# Patient Record
Sex: FEMALE | Race: WHITE | Employment: PART TIME | URBAN - METROPOLITAN AREA
[De-identification: names, ages, dates, MRNs, and addresses within clinical notes are randomized per-mention and may not be internally consistent; named-entity substitution may affect disease eponyms.]

---

## 2017-01-12 DIAGNOSIS — F32.A DEPRESSION: ICD-10-CM

## 2017-01-13 RX ORDER — VENLAFAXINE HYDROCHLORIDE 37.5 MG/1
CAPSULE, EXTENDED RELEASE ORAL
Qty: 30 CAPSULE | Refills: 5 | Status: SHIPPED | OUTPATIENT
Start: 2017-01-13 | End: 2017-07-19 | Stop reason: SDUPTHER

## 2017-02-22 RX ORDER — ADAPALENE/BENZOYL PEROXIDE 0.1 %-2.5%
GEL (GRAM) TOPICAL
Qty: 45 G | Refills: 0 | Status: SHIPPED | OUTPATIENT
Start: 2017-02-22 | End: 2018-05-04 | Stop reason: SDUPTHER

## 2017-03-17 RX ORDER — LISINOPRIL AND HYDROCHLOROTHIAZIDE 12.5; 1 MG/1; MG/1
TABLET ORAL
Qty: 90 TABLET | Refills: 1 | Status: SHIPPED | OUTPATIENT
Start: 2017-03-17 | End: 2017-09-17 | Stop reason: SDUPTHER

## 2017-04-18 RX ORDER — ONDANSETRON 8 MG/1
TABLET, ORALLY DISINTEGRATING ORAL
Qty: 40 TABLET | Refills: 0 | Status: SHIPPED | OUTPATIENT
Start: 2017-04-18 | End: 2018-05-04 | Stop reason: SDUPTHER

## 2017-04-18 RX ORDER — LEVOTHYROXINE SODIUM 0.05 MG/1
TABLET ORAL
Qty: 90 TABLET | Refills: 0 | Status: SHIPPED | OUTPATIENT
Start: 2017-04-18 | End: 2017-07-19 | Stop reason: SDUPTHER

## 2017-07-19 DIAGNOSIS — F32.A DEPRESSION: ICD-10-CM

## 2017-07-19 RX ORDER — VENLAFAXINE HYDROCHLORIDE 37.5 MG/1
CAPSULE, EXTENDED RELEASE ORAL
Qty: 30 CAPSULE | Refills: 0 | Status: SHIPPED | OUTPATIENT
Start: 2017-07-19 | End: 2017-08-21 | Stop reason: SDUPTHER

## 2017-07-19 RX ORDER — LEVOTHYROXINE SODIUM 0.05 MG/1
TABLET ORAL
Qty: 90 TABLET | Refills: 0 | Status: SHIPPED | OUTPATIENT
Start: 2017-07-19 | End: 2017-10-23 | Stop reason: SDUPTHER

## 2017-08-21 RX ORDER — VENLAFAXINE HYDROCHLORIDE 37.5 MG/1
CAPSULE, EXTENDED RELEASE ORAL
Qty: 30 CAPSULE | Refills: 3 | Status: SHIPPED | OUTPATIENT
Start: 2017-08-21 | End: 2017-12-25 | Stop reason: SDUPTHER

## 2017-09-18 RX ORDER — LISINOPRIL AND HYDROCHLOROTHIAZIDE 12.5; 1 MG/1; MG/1
TABLET ORAL
Qty: 90 TABLET | Refills: 1 | Status: SHIPPED | OUTPATIENT
Start: 2017-09-18 | End: 2018-04-14 | Stop reason: SDUPTHER

## 2017-10-23 RX ORDER — LEVOTHYROXINE SODIUM 0.05 MG/1
TABLET ORAL
Qty: 90 TABLET | Refills: 1 | Status: SHIPPED | OUTPATIENT
Start: 2017-10-23 | End: 2018-04-15 | Stop reason: SDUPTHER

## 2017-12-26 RX ORDER — VENLAFAXINE HYDROCHLORIDE 37.5 MG/1
37.5 CAPSULE, EXTENDED RELEASE ORAL DAILY
Qty: 30 CAPSULE | Refills: 1 | Status: SHIPPED | OUTPATIENT
Start: 2017-12-26 | End: 2018-02-23 | Stop reason: SDUPTHER

## 2018-02-23 RX ORDER — VENLAFAXINE HYDROCHLORIDE 37.5 MG/1
CAPSULE, EXTENDED RELEASE ORAL
Qty: 30 CAPSULE | Refills: 1 | Status: SHIPPED | OUTPATIENT
Start: 2018-02-23 | End: 2018-05-04 | Stop reason: CLARIF

## 2018-04-16 RX ORDER — LEVOTHYROXINE SODIUM 0.05 MG/1
TABLET ORAL
Qty: 90 TABLET | Refills: 1 | Status: SHIPPED | OUTPATIENT
Start: 2018-04-16 | End: 2022-02-18 | Stop reason: SDUPTHER

## 2018-04-16 RX ORDER — LISINOPRIL AND HYDROCHLOROTHIAZIDE 12.5; 1 MG/1; MG/1
TABLET ORAL
Qty: 90 TABLET | Refills: 1 | Status: SHIPPED | OUTPATIENT
Start: 2018-04-16 | End: 2018-09-26 | Stop reason: SDUPTHER

## 2018-05-04 ENCOUNTER — OFFICE VISIT (OUTPATIENT)
Dept: FAMILY MEDICINE CLINIC | Age: 53
End: 2018-05-04
Payer: COMMERCIAL

## 2018-05-04 VITALS
DIASTOLIC BLOOD PRESSURE: 64 MMHG | HEART RATE: 72 BPM | SYSTOLIC BLOOD PRESSURE: 122 MMHG | RESPIRATION RATE: 16 BRPM | TEMPERATURE: 98.1 F | HEIGHT: 62 IN

## 2018-05-04 DIAGNOSIS — N95.1 PERIMENOPAUSAL: ICD-10-CM

## 2018-05-04 DIAGNOSIS — Z13.31 POSITIVE DEPRESSION SCREENING: ICD-10-CM

## 2018-05-04 DIAGNOSIS — G89.29 CHRONIC BILATERAL LOW BACK PAIN WITHOUT SCIATICA: ICD-10-CM

## 2018-05-04 DIAGNOSIS — E03.9 HYPOTHYROIDISM (ACQUIRED): ICD-10-CM

## 2018-05-04 DIAGNOSIS — F32.A ANXIETY AND DEPRESSION: ICD-10-CM

## 2018-05-04 DIAGNOSIS — R73.9 HYPERGLYCEMIA: ICD-10-CM

## 2018-05-04 DIAGNOSIS — F41.9 ANXIETY AND DEPRESSION: ICD-10-CM

## 2018-05-04 DIAGNOSIS — Z12.31 ENCOUNTER FOR SCREENING MAMMOGRAM FOR BREAST CANCER: ICD-10-CM

## 2018-05-04 DIAGNOSIS — E78.2 MIXED HYPERLIPIDEMIA: Primary | ICD-10-CM

## 2018-05-04 DIAGNOSIS — Z80.0 FAMILY HISTORY OF COLON CANCER: ICD-10-CM

## 2018-05-04 DIAGNOSIS — E78.2 MIXED HYPERLIPIDEMIA: ICD-10-CM

## 2018-05-04 DIAGNOSIS — M54.50 CHRONIC BILATERAL LOW BACK PAIN WITHOUT SCIATICA: ICD-10-CM

## 2018-05-04 LAB
HCT VFR BLD CALC: 40 % (ref 37–47)
HEMOGLOBIN: 13.5 G/DL (ref 12–16)
MCH RBC QN AUTO: 32.2 PG (ref 27–31.3)
MCHC RBC AUTO-ENTMCNC: 33.8 % (ref 33–37)
MCV RBC AUTO: 95.3 FL (ref 82–100)
PDW BLD-RTO: 13.3 % (ref 11.5–14.5)
PLATELET # BLD: 317 K/UL (ref 130–400)
RBC # BLD: 4.2 M/UL (ref 4.2–5.4)
WBC # BLD: 9.8 K/UL (ref 4.8–10.8)

## 2018-05-04 PROCEDURE — G8431 POS CLIN DEPRES SCRN F/U DOC: HCPCS | Performed by: FAMILY MEDICINE

## 2018-05-04 PROCEDURE — 99214 OFFICE O/P EST MOD 30 MIN: CPT | Performed by: FAMILY MEDICINE

## 2018-05-04 RX ORDER — IBUPROFEN 800 MG/1
800 TABLET ORAL EVERY 8 HOURS PRN
Qty: 90 TABLET | Refills: 3 | Status: SHIPPED | OUTPATIENT
Start: 2018-05-04 | End: 2018-09-26 | Stop reason: SDUPTHER

## 2018-05-04 RX ORDER — ONDANSETRON HYDROCHLORIDE 8 MG/1
8 TABLET, FILM COATED ORAL EVERY 8 HOURS PRN
Qty: 30 TABLET | Refills: 1 | Status: SHIPPED | OUTPATIENT
Start: 2018-05-04 | End: 2018-09-26 | Stop reason: SDUPTHER

## 2018-05-04 RX ORDER — VENLAFAXINE HYDROCHLORIDE 75 MG/1
75 CAPSULE, EXTENDED RELEASE ORAL DAILY
Qty: 30 CAPSULE | Refills: 3 | Status: SHIPPED | OUTPATIENT
Start: 2018-05-04 | End: 2018-09-02 | Stop reason: SDUPTHER

## 2018-05-04 ASSESSMENT — PATIENT HEALTH QUESTIONNAIRE - PHQ9
3. TROUBLE FALLING OR STAYING ASLEEP: 1
2. FEELING DOWN, DEPRESSED OR HOPELESS: 2
10. IF YOU CHECKED OFF ANY PROBLEMS, HOW DIFFICULT HAVE THESE PROBLEMS MADE IT FOR YOU TO DO YOUR WORK, TAKE CARE OF THINGS AT HOME, OR GET ALONG WITH OTHER PEOPLE: 1
4. FEELING TIRED OR HAVING LITTLE ENERGY: 0
9. THOUGHTS THAT YOU WOULD BE BETTER OFF DEAD, OR OF HURTING YOURSELF: 0
6. FEELING BAD ABOUT YOURSELF - OR THAT YOU ARE A FAILURE OR HAVE LET YOURSELF OR YOUR FAMILY DOWN: 2
8. MOVING OR SPEAKING SO SLOWLY THAT OTHER PEOPLE COULD HAVE NOTICED. OR THE OPPOSITE, BEING SO FIGETY OR RESTLESS THAT YOU HAVE BEEN MOVING AROUND A LOT MORE THAN USUAL: 1
SUM OF ALL RESPONSES TO PHQ QUESTIONS 1-9: 10
5. POOR APPETITE OR OVEREATING: 1
7. TROUBLE CONCENTRATING ON THINGS, SUCH AS READING THE NEWSPAPER OR WATCHING TELEVISION: 2
1. LITTLE INTEREST OR PLEASURE IN DOING THINGS: 1
SUM OF ALL RESPONSES TO PHQ9 QUESTIONS 1 & 2: 3

## 2018-05-05 LAB
ALBUMIN SERPL-MCNC: 4.4 G/DL (ref 3.9–4.9)
ALP BLD-CCNC: 87 U/L (ref 40–130)
ALT SERPL-CCNC: 15 U/L (ref 0–33)
ANION GAP SERPL CALCULATED.3IONS-SCNC: 15 MEQ/L (ref 7–13)
AST SERPL-CCNC: 14 U/L (ref 0–35)
BILIRUB SERPL-MCNC: 0.3 MG/DL (ref 0–1.2)
BUN BLDV-MCNC: 29 MG/DL (ref 6–20)
CALCIUM SERPL-MCNC: 9 MG/DL (ref 8.6–10.2)
CHLORIDE BLD-SCNC: 100 MEQ/L (ref 98–107)
CHOLESTEROL, TOTAL: 224 MG/DL (ref 0–199)
CO2: 24 MEQ/L (ref 22–29)
CREAT SERPL-MCNC: 0.85 MG/DL (ref 0.5–0.9)
FOLLICLE STIMULATING HORMONE: 106.1 MIU/ML
GFR AFRICAN AMERICAN: >60
GFR NON-AFRICAN AMERICAN: >60
GLOBULIN: 2.4 G/DL (ref 2.3–3.5)
GLUCOSE BLD-MCNC: 68 MG/DL (ref 74–109)
HBA1C MFR BLD: 5.5 % (ref 4.8–5.9)
HDLC SERPL-MCNC: 55 MG/DL (ref 40–59)
LDL CHOLESTEROL CALCULATED: 138 MG/DL (ref 0–129)
LUTEINIZING HORMONE: 56.3 MIU/ML
POTASSIUM SERPL-SCNC: 4.5 MEQ/L (ref 3.5–5.1)
SODIUM BLD-SCNC: 139 MEQ/L (ref 132–144)
T4 FREE: 1.3 NG/DL (ref 0.93–1.7)
TOTAL PROTEIN: 6.8 G/DL (ref 6.4–8.1)
TRIGL SERPL-MCNC: 153 MG/DL (ref 0–200)
TSH SERPL DL<=0.05 MIU/L-ACNC: 1.54 UIU/ML (ref 0.27–4.2)

## 2018-05-11 ENCOUNTER — TELEPHONE (OUTPATIENT)
Dept: FAMILY MEDICINE CLINIC | Age: 53
End: 2018-05-11

## 2018-06-04 RX ORDER — LEVOTHYROXINE SODIUM 0.05 MG/1
TABLET ORAL
Qty: 90 TABLET | Refills: 1 | Status: SHIPPED | OUTPATIENT
Start: 2018-06-04 | End: 2018-09-26 | Stop reason: SDUPTHER

## 2018-07-12 ENCOUNTER — TELEPHONE (OUTPATIENT)
Dept: FAMILY MEDICINE CLINIC | Age: 53
End: 2018-07-12

## 2018-07-12 ENCOUNTER — NURSE ONLY (OUTPATIENT)
Dept: FAMILY MEDICINE CLINIC | Age: 53
End: 2018-07-12
Payer: COMMERCIAL

## 2018-07-12 DIAGNOSIS — R30.0 DYSURIA: ICD-10-CM

## 2018-07-12 DIAGNOSIS — R30.0 DYSURIA: Primary | ICD-10-CM

## 2018-07-12 LAB
BILIRUBIN, POC: ABNORMAL
BLOOD URINE, POC: ABNORMAL
CLARITY, POC: ABNORMAL
COLOR, POC: ABNORMAL
GLUCOSE URINE, POC: ABNORMAL
KETONES, POC: ABNORMAL
LEUKOCYTE EST, POC: ABNORMAL
NITRITE, POC: ABNORMAL
PH, POC: 6
PROTEIN, POC: ABNORMAL
SPECIFIC GRAVITY, POC: 1.03
UROBILINOGEN, POC: ABNORMAL

## 2018-07-12 PROCEDURE — 81003 URINALYSIS AUTO W/O SCOPE: CPT | Performed by: FAMILY MEDICINE

## 2018-07-12 NOTE — TELEPHONE ENCOUNTER
Patient called in to check on the status of UA results and an Rx. As she is in discomfort and has frequent urination.

## 2018-07-12 NOTE — TELEPHONE ENCOUNTER
Pt c/o 'bad burning' with urination  Requesting to start atbc today BASIL [facility] out of office)  Please see ua results and please advise    thanks

## 2018-07-12 NOTE — TELEPHONE ENCOUNTER
Patient called sounding really miserable, complaining why she was given a run around stating that the Nurse practitioner will give her a prescription for anantibiotic while waiting for her culture to be processed. She is really in pain and is requesting if this can be forwarded to any of the providers that was here tonight. She said she's been waiting all day for a call from our office. Please advise.     -Percy Carrel.

## 2018-07-13 RX ORDER — NITROFURANTOIN 25; 75 MG/1; MG/1
100 CAPSULE ORAL 2 TIMES DAILY
Qty: 20 CAPSULE | Refills: 0 | Status: SHIPPED | OUTPATIENT
Start: 2018-07-13 | End: 2018-07-23

## 2018-07-14 LAB — URINE CULTURE, ROUTINE: NORMAL

## 2018-07-16 ENCOUNTER — TELEPHONE (OUTPATIENT)
Dept: FAMILY MEDICINE CLINIC | Age: 53
End: 2018-07-16

## 2018-07-16 NOTE — TELEPHONE ENCOUNTER
Patient states she got a medication for a uti on 7/13/18 and its still not working  Still feels the same, she states last time she had this problem she was given cipro or bacterium and they worked fine she is requesting 1 of those.      If approved-Pharmacy:[facility] [address]

## 2018-07-17 ENCOUNTER — OFFICE VISIT (OUTPATIENT)
Dept: FAMILY MEDICINE CLINIC | Age: 53
End: 2018-07-17
Payer: COMMERCIAL

## 2018-07-17 VITALS
HEART RATE: 72 BPM | HEIGHT: 62 IN | DIASTOLIC BLOOD PRESSURE: 86 MMHG | RESPIRATION RATE: 16 BRPM | SYSTOLIC BLOOD PRESSURE: 138 MMHG | TEMPERATURE: 97.9 F

## 2018-07-17 DIAGNOSIS — R31.29 MICROHEMATURIA: ICD-10-CM

## 2018-07-17 DIAGNOSIS — Z12.31 ENCOUNTER FOR SCREENING MAMMOGRAM FOR BREAST CANCER: ICD-10-CM

## 2018-07-17 DIAGNOSIS — R10.9 LEFT FLANK PAIN: ICD-10-CM

## 2018-07-17 DIAGNOSIS — N20.0 NEPHROLITHIASIS: ICD-10-CM

## 2018-07-17 DIAGNOSIS — R31.29 MICROHEMATURIA: Primary | ICD-10-CM

## 2018-07-17 LAB
BILIRUBIN, POC: ABNORMAL
BLOOD URINE, POC: ABNORMAL
CLARITY, POC: ABNORMAL
COLOR, POC: ABNORMAL
GLUCOSE URINE, POC: ABNORMAL
KETONES, POC: ABNORMAL
LEUKOCYTE EST, POC: ABNORMAL
NITRITE, POC: ABNORMAL
PH, POC: 5.5
PROTEIN, POC: ABNORMAL
SPECIFIC GRAVITY, POC: 1.03
UROBILINOGEN, POC: ABNORMAL

## 2018-07-17 PROCEDURE — 81003 URINALYSIS AUTO W/O SCOPE: CPT | Performed by: FAMILY MEDICINE

## 2018-07-17 PROCEDURE — 99213 OFFICE O/P EST LOW 20 MIN: CPT | Performed by: FAMILY MEDICINE

## 2018-07-17 RX ORDER — TAMSULOSIN HYDROCHLORIDE 0.4 MG/1
0.4 CAPSULE ORAL DAILY
Qty: 30 CAPSULE | Refills: 3 | Status: SHIPPED | OUTPATIENT
Start: 2018-07-17

## 2018-07-17 RX ORDER — TRAMADOL HYDROCHLORIDE 50 MG/1
50 TABLET ORAL EVERY 4 HOURS PRN
Qty: 30 TABLET | Refills: 0 | Status: SHIPPED | OUTPATIENT
Start: 2018-07-17 | End: 2019-01-11 | Stop reason: SDUPTHER

## 2018-07-17 RX ORDER — CIPROFLOXACIN 500 MG/1
500 TABLET, FILM COATED ORAL 2 TIMES DAILY
Qty: 14 TABLET | Refills: 0 | Status: SHIPPED | OUTPATIENT
Start: 2018-07-17 | End: 2019-01-11 | Stop reason: SDUPTHER

## 2018-07-17 NOTE — PROGRESS NOTES
The patient denies any history of      seizures,             heart attack or KNOWN CAD        or stroke. No chest pain, shortness of breath, paroxysmal nocturnal dyspnea. No nausea, vomiting, diarrhea, hematochezia or melena. No paresthesias or headaches. NO dysuria NOW& frequency NO GROSS hematuria. Feels pressure-no dysuria now    Last labs  Orders Only on 07/12/2018   Component Date Value Ref Range Status    Urine Culture, Routine 07/12/2018 No growth 24 hours   Final   Nurse Only on 07/12/2018   Component Date Value Ref Range Status    Glucose, UA POC 07/12/2018 neg   Final    Bilirubin, UA 07/12/2018 neg   Final    Ketones, UA 07/12/2018 neg   Final    Spec Grav, UA 07/12/2018 1.030   Final    Blood, UA POC 07/12/2018 2+*  Final    pH, UA 07/12/2018 6.0   Final    Protein, UA POC 07/12/2018 1+*  Final    Urobilinogen, UA 07/12/2018 neg   Final    Leukocytes, UA 07/12/2018 neg   Final    Nitrite, UA 07/12/2018 neg   Final   Orders Only on 05/04/2018   Component Date Value Ref Range Status    Cholesterol, Total 05/04/2018 224* 0 - 199 mg/dL Final    ATP III Cholesterol Classification is Borderline High.  Triglycerides 05/04/2018 153  0 - 200 mg/dL Final    ATP III Triglycerides Classification is Borderline High.  HDL 05/04/2018 55  40 - 59 mg/dL Final    Comment: ATP III HDL Cholesterol Classification is Desirable. Expected Values:    Males:    >55 = No Risk            35-55 = Moderate Risk            <35 = High Risk    Females:  >65 = No Risk            45-65 = Moderate Risk            <45 = High Risk    NCEP Guidelines: Third Report May 2001  >59 = negative risk factor for CHD  <40 = major risk factor for CHD      LDL Calculated 05/04/2018 138* 0 - 129 mg/dL Final    ATT III Classification is Borderline High.  WBC 05/04/2018 9.8  4.8 - 10.8 K/uL Final    RBC 05/04/2018 4.20  4. 20 - 5.40 M/uL Final    Hemoglobin 05/04/2018 13.5  12.0 - 16.0 g/dL Final    murmur, rub or gallop. ABDOMEN:  Soft, non tender. No masses, guarding or rebound. Normo active bowel sounds. EXTREMITIES:  No edema in any extremity. No cyanosis or clubbing. 2+ dorsalis pedis pulses bilaterally    NO CVA TENDERNESS      Assessment & Plan    Diagnosis Orders   1. Microhematuria  POCT Urinalysis No Micro (Auto)    CT KIDNEY WO CONTRAST    Urine Culture    Cytology, Non-Gyn   2. Encounter for screening mammogram for breast cancer  ERIC DIGITAL SCREEN W CAD BILATERAL   3. Nephrolithiasis  CT KIDNEY WO CONTRAST   4. Left flank pain  CT KIDNEY WO CONTRAST     Orders Placed This Encounter   Procedures    POCT Urinalysis No Micro (Auto)     No orders of the defined types were placed in this encounter. There are no discontinued medications. No Follow-up on file. The patient was reminded that an antibiotic has been prescribed the predicted course is improvement to cure with no persistent issues. Take antibiotics as directed. If any problems occur, an appointment should be made or ER visit if severe. Because of the risk with ANY antibiotic of C. Difficile colitis if persistent diarrhea or abdominal pain or any concerning symptoms, we should be notified. To reduce this risk, a probiotic pill, yogurt or other preparations containing active cultures should be ingested daily -particularly while on the antibiotic. If any persistent symptoms of illness, follow up appointment should be made in a timely fashion with a physician. Add flomax Discussed risks, benefits, and alternatives of this medicine and advised to call and discontinue if concerning side effects.   No menses in 1 y  To er if sxs worsen    Zainab Samayoa MD

## 2018-07-19 LAB — URINE CULTURE, ROUTINE: NORMAL

## 2018-09-02 RX ORDER — VENLAFAXINE HYDROCHLORIDE 75 MG/1
75 CAPSULE, EXTENDED RELEASE ORAL DAILY
Qty: 30 CAPSULE | Refills: 0 | Status: SHIPPED | OUTPATIENT
Start: 2018-09-02 | End: 2018-09-26 | Stop reason: DRUGHIGH

## 2018-09-04 RX ORDER — VENLAFAXINE HYDROCHLORIDE 75 MG/1
75 CAPSULE, EXTENDED RELEASE ORAL DAILY
Qty: 30 CAPSULE | Refills: 5 | Status: SHIPPED | OUTPATIENT
Start: 2018-09-04 | End: 2018-09-26 | Stop reason: SDUPTHER

## 2018-09-04 RX ORDER — VENLAFAXINE HYDROCHLORIDE 37.5 MG/1
CAPSULE, EXTENDED RELEASE ORAL
Qty: 30 CAPSULE | Refills: 5 | Status: SHIPPED | OUTPATIENT
Start: 2018-09-04 | End: 2018-09-26 | Stop reason: DRUGHIGH

## 2018-09-26 RX ORDER — VENLAFAXINE HYDROCHLORIDE 75 MG/1
75 CAPSULE, EXTENDED RELEASE ORAL DAILY
Qty: 30 CAPSULE | Refills: 5 | Status: SHIPPED | OUTPATIENT
Start: 2018-09-26 | End: 2018-09-26 | Stop reason: DRUGHIGH

## 2018-09-26 RX ORDER — LISINOPRIL AND HYDROCHLOROTHIAZIDE 12.5; 1 MG/1; MG/1
TABLET ORAL
Qty: 90 TABLET | Refills: 1 | Status: SHIPPED | OUTPATIENT
Start: 2018-09-26 | End: 2019-04-18 | Stop reason: SDUPTHER

## 2018-09-26 RX ORDER — ONDANSETRON 8 MG/1
8 TABLET, ORALLY DISINTEGRATING ORAL EVERY 8 HOURS PRN
Qty: 30 TABLET | Refills: 1 | Status: SHIPPED | OUTPATIENT
Start: 2018-09-26

## 2018-09-26 RX ORDER — LEVOTHYROXINE SODIUM 0.05 MG/1
TABLET ORAL
Qty: 90 TABLET | Refills: 1 | Status: SHIPPED | OUTPATIENT
Start: 2018-09-26

## 2018-09-26 RX ORDER — IBUPROFEN 800 MG/1
800 TABLET ORAL EVERY 8 HOURS PRN
Qty: 90 TABLET | Refills: 1 | Status: SHIPPED | OUTPATIENT
Start: 2018-09-26 | End: 2019-01-19 | Stop reason: SDUPTHER

## 2018-09-26 RX ORDER — VENLAFAXINE HYDROCHLORIDE 150 MG/1
150 CAPSULE, EXTENDED RELEASE ORAL DAILY
Qty: 30 CAPSULE | Refills: 1 | Status: SHIPPED | OUTPATIENT
Start: 2018-09-26 | End: 2018-11-23 | Stop reason: SDUPTHER

## 2018-11-26 RX ORDER — VENLAFAXINE HYDROCHLORIDE 150 MG/1
150 CAPSULE, EXTENDED RELEASE ORAL DAILY
Qty: 30 CAPSULE | Refills: 5 | Status: SHIPPED | OUTPATIENT
Start: 2018-11-26

## 2019-01-11 ENCOUNTER — OFFICE VISIT (OUTPATIENT)
Dept: FAMILY MEDICINE CLINIC | Age: 54
End: 2019-01-11
Payer: COMMERCIAL

## 2019-01-11 VITALS
BODY MASS INDEX: 34.75 KG/M2 | SYSTOLIC BLOOD PRESSURE: 122 MMHG | TEMPERATURE: 97.3 F | HEART RATE: 87 BPM | HEIGHT: 62 IN | DIASTOLIC BLOOD PRESSURE: 70 MMHG | OXYGEN SATURATION: 99 %

## 2019-01-11 DIAGNOSIS — R30.0 DYSURIA: ICD-10-CM

## 2019-01-11 DIAGNOSIS — R11.2 NAUSEA AND VOMITING, INTRACTABILITY OF VOMITING NOT SPECIFIED, UNSPECIFIED VOMITING TYPE: ICD-10-CM

## 2019-01-11 DIAGNOSIS — R10.9 LEFT FLANK PAIN: ICD-10-CM

## 2019-01-11 DIAGNOSIS — N20.0 NEPHROLITHIASIS: ICD-10-CM

## 2019-01-11 DIAGNOSIS — R31.29 MICROHEMATURIA: ICD-10-CM

## 2019-01-11 DIAGNOSIS — M54.9 CVA TENDERNESS: Primary | ICD-10-CM

## 2019-01-11 LAB
BILIRUBIN, POC: NORMAL
BLOOD URINE, POC: NORMAL
CLARITY, POC: CLEAR
COLOR, POC: NORMAL
GLUCOSE URINE, POC: NORMAL
KETONES, POC: NORMAL
LEUKOCYTE EST, POC: NORMAL
NITRITE, POC: NORMAL
PH, POC: 6
PROTEIN, POC: NORMAL
SPECIFIC GRAVITY, POC: 1.02
UROBILINOGEN, POC: NORMAL

## 2019-01-11 PROCEDURE — 96372 THER/PROPH/DIAG INJ SC/IM: CPT | Performed by: NURSE PRACTITIONER

## 2019-01-11 PROCEDURE — 99214 OFFICE O/P EST MOD 30 MIN: CPT | Performed by: NURSE PRACTITIONER

## 2019-01-11 PROCEDURE — 81003 URINALYSIS AUTO W/O SCOPE: CPT | Performed by: NURSE PRACTITIONER

## 2019-01-11 RX ORDER — PROMETHAZINE HYDROCHLORIDE 25 MG/ML
25 INJECTION, SOLUTION INTRAMUSCULAR; INTRAVENOUS ONCE
Status: COMPLETED | OUTPATIENT
Start: 2019-01-11 | End: 2019-01-11

## 2019-01-11 RX ORDER — TRAMADOL HYDROCHLORIDE 50 MG/1
50 TABLET ORAL EVERY 4 HOURS PRN
Qty: 30 TABLET | Refills: 0 | Status: SHIPPED | OUTPATIENT
Start: 2019-01-11 | End: 2019-01-16

## 2019-01-11 RX ORDER — CIPROFLOXACIN 500 MG/1
500 TABLET, FILM COATED ORAL 2 TIMES DAILY
Qty: 14 TABLET | Refills: 0 | Status: SHIPPED | OUTPATIENT
Start: 2019-01-11 | End: 2019-01-18

## 2019-01-11 RX ORDER — PROMETHAZINE HYDROCHLORIDE 25 MG/1
25 TABLET ORAL 4 TIMES DAILY PRN
Qty: 20 TABLET | Refills: 0 | Status: SHIPPED | OUTPATIENT
Start: 2019-01-11 | End: 2019-01-18

## 2019-01-11 RX ADMIN — PROMETHAZINE HYDROCHLORIDE 25 MG: 25 INJECTION, SOLUTION INTRAMUSCULAR; INTRAVENOUS at 12:48

## 2019-01-11 ASSESSMENT — ENCOUNTER SYMPTOMS
ABDOMINAL PAIN: 1
SINUS PAIN: 0
BLOOD IN STOOL: 0
NAUSEA: 1
RECTAL PAIN: 0
DIARRHEA: 0
COUGH: 1
SORE THROAT: 0
ANAL BLEEDING: 0
VOMITING: 1
SINUS PRESSURE: 0
RHINORRHEA: 0
SWOLLEN GLANDS: 0
ABDOMINAL DISTENTION: 0
CONSTIPATION: 0

## 2019-01-13 LAB — URINE CULTURE, ROUTINE: NORMAL

## 2019-01-21 RX ORDER — IBUPROFEN 800 MG/1
800 TABLET ORAL EVERY 8 HOURS PRN
Qty: 90 TABLET | Refills: 1 | Status: SHIPPED | OUTPATIENT
Start: 2019-01-21

## 2019-01-21 RX ORDER — LISINOPRIL AND HYDROCHLOROTHIAZIDE 12.5; 1 MG/1; MG/1
TABLET ORAL
Qty: 90 TABLET | Refills: 1 | Status: SHIPPED | OUTPATIENT
Start: 2019-01-21 | End: 2019-04-18

## 2019-03-29 RX ORDER — LISINOPRIL AND HYDROCHLOROTHIAZIDE 12.5; 1 MG/1; MG/1
TABLET ORAL
Qty: 90 TABLET | Refills: 0 | OUTPATIENT
Start: 2019-03-29

## 2019-04-18 RX ORDER — LISINOPRIL AND HYDROCHLOROTHIAZIDE 12.5; 1 MG/1; MG/1
TABLET ORAL
Qty: 30 TABLET | Refills: 0 | Status: SHIPPED | OUTPATIENT
Start: 2019-04-18

## 2019-04-18 NOTE — TELEPHONE ENCOUNTER
Pt has new to provider appt 4/30 at 1:30. She has been out of meds for 2 days. She is very upset with previous dr Jillian Kline) and said she is in desperate need of the meds, going on vacation, and didn't realize she didn't have any refills left.

## 2022-02-18 ENCOUNTER — OFFICE VISIT (OUTPATIENT)
Dept: ENDOCRINOLOGY | Age: 57
End: 2022-02-18
Payer: COMMERCIAL

## 2022-02-18 VITALS
HEART RATE: 91 BPM | SYSTOLIC BLOOD PRESSURE: 127 MMHG | HEIGHT: 64 IN | BODY MASS INDEX: 31.41 KG/M2 | OXYGEN SATURATION: 98 % | WEIGHT: 184 LBS | DIASTOLIC BLOOD PRESSURE: 80 MMHG

## 2022-02-18 DIAGNOSIS — H66.90 ACUTE OTITIS MEDIA, UNSPECIFIED OTITIS MEDIA TYPE: ICD-10-CM

## 2022-02-18 DIAGNOSIS — R63.5 WEIGHT GAIN: Primary | ICD-10-CM

## 2022-02-18 PROCEDURE — 99203 OFFICE O/P NEW LOW 30 MIN: CPT | Performed by: INTERNAL MEDICINE

## 2022-02-18 RX ORDER — AZITHROMYCIN 250 MG/1
250 TABLET, FILM COATED ORAL SEE ADMIN INSTRUCTIONS
Qty: 6 TABLET | Refills: 0 | Status: SHIPPED | OUTPATIENT
Start: 2022-02-18 | End: 2022-02-23

## 2022-02-18 RX ORDER — PHENTERMINE HYDROCHLORIDE 37.5 MG/1
37.5 TABLET ORAL
Qty: 30 TABLET | Refills: 0 | Status: SHIPPED | OUTPATIENT
Start: 2022-02-18 | End: 2022-03-22 | Stop reason: SDUPTHER

## 2022-02-18 NOTE — PROGRESS NOTES
2/18/2022    Assessment:       Diagnosis Orders   1. Weight gain     2. Acute otitis media, unspecified otitis media type  azithromycin (ZITHROMAX) 250 MG tablet    phentermine (ADIPEX-P) 37.5 MG tablet         PLAN:     Given prescription for phentermine and also a Z-Kiet for otitis follow-up in 4 weeks time  More than 50% of 30-minute spent patient education counseling  Reviewed OARRS report  Orders Placed This Encounter   Medications    azithromycin (ZITHROMAX) 250 MG tablet     Sig: Take 1 tablet by mouth See Admin Instructions for 5 days 500mg on day 1 followed by 250mg on days 2 - 5     Dispense:  6 tablet     Refill:  0    phentermine (ADIPEX-P) 37.5 MG tablet     Sig: Take 1 tablet by mouth every morning (before breakfast) for 30 days. Dispense:  30 tablet     Refill:  0       Subjective:     Chief Complaint   Patient presents with    New Patient    Obesity     Vitals:    02/18/22 1302   BP: 127/80   Pulse: 91   SpO2: 98%   Weight: 184 lb (83.5 kg)   Height: 5' 4\" (1.626 m)     Wt Readings from Last 3 Encounters:   02/18/22 184 lb (83.5 kg)   04/08/15 190 lb (86.2 kg)   03/27/14 179 lb (81.2 kg)     BP Readings from Last 3 Encounters:   02/18/22 127/80   01/11/19 122/70   07/17/18 138/86     Patient referred here for weight gain obesity BMI is at 31 wants to try weight loss meds thyroid function tests are glucose levels have been normal as per patient  History of hypothyroidism  History of hypertension  Patient also complains of congestion mediated ear pain once antibiotics    Other  This is a new (Obesity weight gain) problem. The current episode started more than 1 year ago. The problem occurs intermittently. The problem has been waxing and waning. Associated symptoms include fatigue. The symptoms are aggravated by eating. She has tried nothing for the symptoms. The treatment provided no relief.      Past Medical History:   Diagnosis Date    Depression     Other chronic cystitis with hematuria     Renal stones      Past Surgical History:   Procedure Laterality Date    BREAST ENHANCEMENT SURGERY  2007    COLONOSCOPY  2000?    (-)     Social History     Socioeconomic History    Marital status:      Spouse name: Not on file    Number of children: Not on file    Years of education: Not on file    Highest education level: Not on file   Occupational History    Not on file   Tobacco Use    Smoking status: Never Smoker    Smokeless tobacco: Never Used   Substance and Sexual Activity    Alcohol use: Not on file    Drug use: Not on file    Sexual activity: Not on file   Other Topics Concern    Not on file   Social History Narrative    Not on file     Social Determinants of Health     Financial Resource Strain:     Difficulty of Paying Living Expenses: Not on file   Food Insecurity:     Worried About Running Out of Food in the Last Year: Not on file    Mickey of Food in the Last Year: Not on file   Transportation Needs:     Lack of Transportation (Medical): Not on file    Lack of Transportation (Non-Medical):  Not on file   Physical Activity:     Days of Exercise per Week: Not on file    Minutes of Exercise per Session: Not on file   Stress:     Feeling of Stress : Not on file   Social Connections:     Frequency of Communication with Friends and Family: Not on file    Frequency of Social Gatherings with Friends and Family: Not on file    Attends [employer] Services: Not on file    Active Member of [address] [place] [address] [employer] or Organizations: Not on file    Attends Club or Organization Meetings: Not on file    Marital Status: Not on file   Intimate Partner Violence:     Fear of Current or Ex-Partner: Not on file    Emotionally Abused: Not on file    Physically Abused: Not on file    Sexually Abused: Not on file   Housing Stability:     Unable to Pay for Housing in the Last Year: Not on file    Number of [place] in the Last Year: Not on file    Unstable Housing in the Last Year: Not on file Family History   Problem Relation Age of Onset    Cancer Father      Allergies   Allergen Reactions    Tylenol With Codeine #3 [Acetaminophen-Codeine]        Current Outpatient Medications:     lisinopril-hydrochlorothiazide (PRINZIDE;ZESTORETIC) 10-12.5 MG per tablet, TAKE 1 TABLET BY MOUTH DAILY, Disp: 30 tablet, Rfl: 0    ibuprofen (ADVIL;MOTRIN) 800 MG tablet, TAKE 1 TABLET BY MOUTH EVERY 8 HOURS AS NEEDED FOR PAIN, Disp: 90 tablet, Rfl: 1    venlafaxine (EFFEXOR XR) 150 MG extended release capsule, TAKE 1 CAPSULE BY MOUTH DAILY, Disp: 30 capsule, Rfl: 5    ondansetron (ZOFRAN-ODT) 8 MG TBDP disintegrating tablet, TAKE 1 TABLET BY MOUTH EVERY 8 HOURS AS NEEDED FOR NAUSEA OR VOMITING, Disp: 30 tablet, Rfl: 1    levothyroxine (SYNTHROID) 50 MCG tablet, TAKE 1 TABLET BY MOUTH DAILY, Disp: 90 tablet, Rfl: 1    tamsulosin (FLOMAX) 0.4 MG capsule, Take 1 capsule by mouth daily, Disp: 30 capsule, Rfl: 3  Lab Results   Component Value Date     05/04/2018    K 4.5 05/04/2018     05/04/2018    CO2 24 05/04/2018    BUN 29 (H) 05/04/2018    CREATININE 0.85 05/04/2018    GLUCOSE 68 (L) 05/04/2018    CALCIUM 9.0 05/04/2018    PROT 6.8 05/04/2018    LABALBU 4.4 05/04/2018    BILITOT 0.3 05/04/2018    ALKPHOS 87 05/04/2018    AST 14 05/04/2018    ALT 15 05/04/2018    LABGLOM >60.0 05/04/2018    GFRAA >60.0 05/04/2018    GLOB 2.4 05/04/2018     Lab Results   Component Value Date    WBC 9.8 05/04/2018    HGB 13.5 05/04/2018    HCT 40.0 05/04/2018    MCV 95.3 05/04/2018     05/04/2018     Lab Results   Component Value Date    LABA1C 5.5 05/04/2018    LABA1C 5.6 03/27/2014     Lab Results   Component Value Date    HDL 55 05/04/2018    HDL 66 (H) 04/08/2015    HDL 53 03/27/2014    LDLCALC 138 (H) 05/04/2018    LDLCALC 148 (H) 04/08/2015    LDLCALC 119 03/27/2014    CHOL 224 (H) 05/04/2018    CHOL 237 (H) 04/08/2015    CHOL 198 03/27/2014    TRIG 153 05/04/2018    TRIG 117 04/08/2015    TRIG 130 03/27/2014     No results found for: TESTM  Lab Results   Component Value Date    TSH 1.540 05/04/2018    TSH 3.050 04/08/2015    T4FREE 1.30 05/04/2018    T4FREE 0.88 (L) 04/08/2015     No results found for: TPOABS    Review of Systems   Constitutional: Positive for fatigue and unexpected weight change. HENT: Positive for ear pain. Eyes: Negative. Endocrine: Negative. Genitourinary: Negative. All other systems reviewed and are negative. Objective:   Physical Exam  Vitals reviewed. Constitutional:       Appearance: Normal appearance. She is obese. HENT:      Head: Normocephalic and atraumatic. Hair is normal.      Right Ear: External ear normal.      Left Ear: External ear normal.      Ears:        Nose: Nose normal.      Mouth/Throat:      Mouth: Mucous membranes are moist.   Eyes:      General: No scleral icterus. Right eye: No discharge. Left eye: No discharge. Extraocular Movements: Extraocular movements intact. Conjunctiva/sclera: Conjunctivae normal.   Neck:      Trachea: Trachea normal.   Cardiovascular:      Rate and Rhythm: Normal rate and regular rhythm. Heart sounds: Normal heart sounds. Pulmonary:      Effort: Pulmonary effort is normal.      Breath sounds: Normal breath sounds. No wheezing or rhonchi. Abdominal:      Palpations: Abdomen is soft. Musculoskeletal:         General: Normal range of motion. Cervical back: Normal range of motion and neck supple. Skin:     General: Skin is warm and dry. Neurological:      General: No focal deficit present. Mental Status: She is alert and oriented to person, place, and time.    Psychiatric:         Mood and Affect: Mood normal.         Behavior: Behavior normal.

## 2022-02-20 ASSESSMENT — ENCOUNTER SYMPTOMS: EYES NEGATIVE: 1

## 2022-03-22 ENCOUNTER — OFFICE VISIT (OUTPATIENT)
Dept: ENDOCRINOLOGY | Age: 57
End: 2022-03-22
Payer: COMMERCIAL

## 2022-03-22 VITALS
WEIGHT: 184 LBS | DIASTOLIC BLOOD PRESSURE: 84 MMHG | SYSTOLIC BLOOD PRESSURE: 126 MMHG | OXYGEN SATURATION: 97 % | HEIGHT: 64 IN | BODY MASS INDEX: 31.41 KG/M2 | HEART RATE: 97 BPM

## 2022-03-22 DIAGNOSIS — E66.9 OBESITY (BMI 30-39.9): ICD-10-CM

## 2022-03-22 DIAGNOSIS — R63.5 WEIGHT GAIN: Primary | ICD-10-CM

## 2022-03-22 PROCEDURE — 96372 THER/PROPH/DIAG INJ SC/IM: CPT | Performed by: INTERNAL MEDICINE

## 2022-03-22 PROCEDURE — 99213 OFFICE O/P EST LOW 20 MIN: CPT | Performed by: INTERNAL MEDICINE

## 2022-03-22 RX ORDER — PHENTERMINE HYDROCHLORIDE 37.5 MG/1
37.5 TABLET ORAL
Qty: 30 TABLET | Refills: 0 | Status: SHIPPED | OUTPATIENT
Start: 2022-03-22 | End: 2022-04-25 | Stop reason: SDUPTHER

## 2022-03-22 RX ORDER — CYANOCOBALAMIN 1000 UG/ML
1000 INJECTION INTRAMUSCULAR; SUBCUTANEOUS ONCE
Status: COMPLETED | OUTPATIENT
Start: 2022-03-22 | End: 2022-03-22

## 2022-03-22 RX ADMIN — CYANOCOBALAMIN 1000 MCG: 1000 INJECTION INTRAMUSCULAR; SUBCUTANEOUS at 12:18

## 2022-03-22 NOTE — LETTER
[address]  Phone: [phone]  Fax: [phone]    Jennifer Camejo MD        March 22, 2022     Patient: Leonel Paul   YOB: 1965   Date of Visit: 3/22/2022       To Whom It May Concern:     Leonel Paul seen in my office today for an office appointment. If you have any questions or concerns, please don't hesitate to call.     Sincerely,        Jennifer Camejo MD

## 2022-03-22 NOTE — PROGRESS NOTES
Sexual activity: Not on file   Other Topics Concern    Not on file   Social History Narrative    Not on file     Social Determinants of Health     Financial Resource Strain:     Difficulty of Paying Living Expenses: Not on file   Food Insecurity:     Worried About Running Out of Food in the Last Year: Not on file    Mickey of Food in the Last Year: Not on file   Transportation Needs:     Lack of Transportation (Medical): Not on file    Lack of Transportation (Non-Medical):  Not on file   Physical Activity:     Days of Exercise per Week: Not on file    Minutes of Exercise per Session: Not on file   Stress:     Feeling of Stress : Not on file   Social Connections:     Frequency of Communication with Friends and Family: Not on file    Frequency of Social Gatherings with Friends and Family: Not on file    Attends [employer] Services: Not on file    Active Member of [address] [place] [address] [employer] or Organizations: Not on file    Attends Club or Organization Meetings: Not on file    Marital Status: Not on file   Intimate Partner Violence:     Fear of Current or Ex-Partner: Not on file    Emotionally Abused: Not on file    Physically Abused: Not on file    Sexually Abused: Not on file   Housing Stability:     Unable to Pay for Housing in the Last Year: Not on file    Number of [place] in the Last Year: Not on file    Unstable Housing in the Last Year: Not on file     Family History   Problem Relation Age of Onset    Cancer Father      Allergies   Allergen Reactions    Tylenol With Codeine #3 [Acetaminophen-Codeine]        Current Outpatient Medications:     lisinopril-hydrochlorothiazide (PRINZIDE;ZESTORETIC) 10-12.5 MG per tablet, TAKE 1 TABLET BY MOUTH DAILY, Disp: 30 tablet, Rfl: 0    ibuprofen (ADVIL;MOTRIN) 800 MG tablet, TAKE 1 TABLET BY MOUTH EVERY 8 HOURS AS NEEDED FOR PAIN, Disp: 90 tablet, Rfl: 1    venlafaxine (EFFEXOR XR) 150 MG extended release capsule, TAKE 1 CAPSULE BY MOUTH DAILY, Disp: 30 capsule, Rfl: 5    ondansetron (ZOFRAN-ODT) 8 MG TBDP disintegrating tablet, TAKE 1 TABLET BY MOUTH EVERY 8 HOURS AS NEEDED FOR NAUSEA OR VOMITING, Disp: 30 tablet, Rfl: 1    levothyroxine (SYNTHROID) 50 MCG tablet, TAKE 1 TABLET BY MOUTH DAILY, Disp: 90 tablet, Rfl: 1    tamsulosin (FLOMAX) 0.4 MG capsule, Take 1 capsule by mouth daily, Disp: 30 capsule, Rfl: 3  Lab Results   Component Value Date     05/04/2018    K 4.5 05/04/2018     05/04/2018    CO2 24 05/04/2018    BUN 29 (H) 05/04/2018    CREATININE 0.85 05/04/2018    GLUCOSE 68 (L) 05/04/2018    CALCIUM 9.0 05/04/2018    PROT 6.8 05/04/2018    LABALBU 4.4 05/04/2018    BILITOT 0.3 05/04/2018    ALKPHOS 87 05/04/2018    AST 14 05/04/2018    ALT 15 05/04/2018    LABGLOM >60.0 05/04/2018    GFRAA >60.0 05/04/2018    GLOB 2.4 05/04/2018     Lab Results   Component Value Date    WBC 9.8 05/04/2018    HGB 13.5 05/04/2018    HCT 40.0 05/04/2018    MCV 95.3 05/04/2018     05/04/2018     Lab Results   Component Value Date    LABA1C 5.5 05/04/2018    LABA1C 5.6 03/27/2014     Lab Results   Component Value Date    HDL 55 05/04/2018    HDL 66 (H) 04/08/2015    HDL 53 03/27/2014    LDLCALC 138 (H) 05/04/2018    LDLCALC 148 (H) 04/08/2015    LDLCALC 119 03/27/2014    CHOL 224 (H) 05/04/2018    CHOL 237 (H) 04/08/2015    CHOL 198 03/27/2014    TRIG 153 05/04/2018    TRIG 117 04/08/2015    TRIG 130 03/27/2014     No results found for: TESTM  Lab Results   Component Value Date    TSH 1.540 05/04/2018    TSH 3.050 04/08/2015    T4FREE 1.30 05/04/2018    T4FREE 0.88 (L) 04/08/2015     No results found for: TPOABS    Review of Systems   Constitutional: Positive for fatigue. Cardiovascular: Negative. Endocrine: Negative. All other systems reviewed and are negative. Objective:   Physical Exam  Vitals reviewed. Constitutional:       Appearance: Normal appearance. She is obese. HENT:      Head: Normocephalic and atraumatic.       Right Ear: External ear normal.      Left Ear: External ear normal.      Nose: Nose normal.   Eyes:      General: No scleral icterus. Right eye: No discharge. Left eye: No discharge. Extraocular Movements: Extraocular movements intact. Conjunctiva/sclera: Conjunctivae normal.   Cardiovascular:      Rate and Rhythm: Normal rate. Pulmonary:      Effort: Pulmonary effort is normal.   Musculoskeletal:         General: Normal range of motion. Cervical back: Normal range of motion and neck supple. Neurological:      General: No focal deficit present. Mental Status: She is alert and oriented to person, place, and time.    Psychiatric:         Mood and Affect: Mood normal.         Behavior: Behavior normal.

## 2022-04-25 ENCOUNTER — OFFICE VISIT (OUTPATIENT)
Dept: ENDOCRINOLOGY | Age: 57
End: 2022-04-25
Payer: COMMERCIAL

## 2022-04-25 VITALS
BODY MASS INDEX: 31.41 KG/M2 | DIASTOLIC BLOOD PRESSURE: 70 MMHG | HEART RATE: 85 BPM | WEIGHT: 184 LBS | HEIGHT: 64 IN | SYSTOLIC BLOOD PRESSURE: 105 MMHG | OXYGEN SATURATION: 98 %

## 2022-04-25 DIAGNOSIS — R63.5 WEIGHT GAIN: Primary | ICD-10-CM

## 2022-04-25 DIAGNOSIS — E66.9 OBESITY (BMI 30-39.9): ICD-10-CM

## 2022-04-25 PROCEDURE — 99213 OFFICE O/P EST LOW 20 MIN: CPT | Performed by: INTERNAL MEDICINE

## 2022-04-25 RX ORDER — PHENTERMINE HYDROCHLORIDE 37.5 MG/1
37.5 TABLET ORAL
Qty: 30 TABLET | Refills: 0 | Status: SHIPPED | OUTPATIENT
Start: 2022-04-25 | End: 2022-05-25

## 2022-04-25 RX ORDER — DULAGLUTIDE 0.75 MG/.5ML
0.75 INJECTION, SOLUTION SUBCUTANEOUS WEEKLY
Qty: 4 PEN | Refills: 3 | Status: SHIPPED | OUTPATIENT
Start: 2022-04-25

## 2022-04-25 RX ORDER — DULAGLUTIDE 0.75 MG/.5ML
INJECTION, SOLUTION SUBCUTANEOUS
Qty: 2 PEN | Refills: 0 | COMMUNITY
Start: 2022-04-25

## 2022-04-25 ASSESSMENT — ENCOUNTER SYMPTOMS: CONSTIPATION: 1

## 2022-04-25 NOTE — PROGRESS NOTES
4/25/2022    Assessment:       Diagnosis Orders   1. Weight gain     2. Obesity (BMI 30-39. 9)           PLAN:     Advised to increase protein and some carbs in her diet  Given prescription for Trulicity  Continue phentermine  Follow-up in 3 months  BMI target less than 30  OARRS report was reviewed  The patient is asked to make an attempt to improve diet and exercise patterns to aid in medical management of this problem. Orders Placed This Encounter   Medications    Dulaglutide (TRULICITY) 6.36 EH/0.5QL SOPN     Sig: Inject 0.75 mg into the skin once a week     Dispense:  4 pen     Refill:  3    phentermine (ADIPEX-P) 37.5 MG tablet     Sig: Take 1 tablet by mouth every morning (before breakfast) for 30 days. Dispense:  30 tablet     Refill:  0       Subjective:     Chief Complaint   Patient presents with    Obesity    Weight Gain     Vitals:    04/25/22 1146   BP: 105/70   Pulse: 85   SpO2: 98%   Weight: 184 lb (83.5 kg)   Height: 5' 4\" (1.626 m)     Wt Readings from Last 3 Encounters:   04/25/22 184 lb (83.5 kg)   03/22/22 184 lb (83.5 kg)   02/18/22 184 lb (83.5 kg)     BP Readings from Last 3 Encounters:   04/25/22 105/70   03/22/22 126/84   02/18/22 127/80     Follow-up on obesity history of hypothyroidism hypertension patient has not been able to lose weight over the last 8 weeks time BMI is 31 does complain of some constipation      Other  This is a recurrent (Obesity weight gain) problem. The current episode started more than 1 year ago. The problem occurs intermittently. The problem has been waxing and waning. Associated symptoms include fatigue. The symptoms are aggravated by eating and stress. Treatments tried: Phentermine. The treatment provided no relief. Past Medical History:   Diagnosis Date    Depression     Other chronic cystitis with hematuria     Renal stones      Past Surgical History:   Procedure Laterality Date    BREAST ENHANCEMENT SURGERY  2007    COLONOSCOPY  2000?     (-) Social History     Socioeconomic History    Marital status:      Spouse name: Not on file    Number of children: Not on file    Years of education: Not on file    Highest education level: Not on file   Occupational History    Not on file   Tobacco Use    Smoking status: Never Smoker    Smokeless tobacco: Never Used   Substance and Sexual Activity    Alcohol use: Not on file    Drug use: Not on file    Sexual activity: Not on file   Other Topics Concern    Not on file   Social History Narrative    Not on file     Social Determinants of Health     Financial Resource Strain:     Difficulty of Paying Living Expenses: Not on file   Food Insecurity:     Worried About Running Out of Food in the Last Year: Not on file    Mickey of Food in the Last Year: Not on file   Transportation Needs:     Lack of Transportation (Medical): Not on file    Lack of Transportation (Non-Medical):  Not on file   Physical Activity:     Days of Exercise per Week: Not on file    Minutes of Exercise per Session: Not on file   Stress:     Feeling of Stress : Not on file   Social Connections:     Frequency of Communication with Friends and Family: Not on file    Frequency of Social Gatherings with Friends and Family: Not on file    Attends Confucianist Services: Not on file    Active Member of [address] [place] [address] [employer] or Organizations: Not on file    Attends Club or Organization Meetings: Not on file    Marital Status: Not on file   Intimate Partner Violence:     Fear of Current or Ex-Partner: Not on file    Emotionally Abused: Not on file    Physically Abused: Not on file    Sexually Abused: Not on file   Housing Stability:     Unable to Pay for Housing in the Last Year: Not on file    Number of [place] in the Last Year: Not on file    Unstable Housing in the Last Year: Not on file     Family History   Problem Relation Age of Onset    Cancer Father      Allergies   Allergen Reactions    Tylenol With Codeine #3 [Acetaminophen-Codeine]        Current Outpatient Medications:     lisinopril-hydrochlorothiazide (PRINZIDE;ZESTORETIC) 10-12.5 MG per tablet, TAKE 1 TABLET BY MOUTH DAILY, Disp: 30 tablet, Rfl: 0    ibuprofen (ADVIL;MOTRIN) 800 MG tablet, TAKE 1 TABLET BY MOUTH EVERY 8 HOURS AS NEEDED FOR PAIN, Disp: 90 tablet, Rfl: 1    venlafaxine (EFFEXOR XR) 150 MG extended release capsule, TAKE 1 CAPSULE BY MOUTH DAILY, Disp: 30 capsule, Rfl: 5    ondansetron (ZOFRAN-ODT) 8 MG TBDP disintegrating tablet, TAKE 1 TABLET BY MOUTH EVERY 8 HOURS AS NEEDED FOR NAUSEA OR VOMITING, Disp: 30 tablet, Rfl: 1    levothyroxine (SYNTHROID) 50 MCG tablet, TAKE 1 TABLET BY MOUTH DAILY, Disp: 90 tablet, Rfl: 1    tamsulosin (FLOMAX) 0.4 MG capsule, Take 1 capsule by mouth daily, Disp: 30 capsule, Rfl: 3  Lab Results   Component Value Date     05/04/2018    K 4.5 05/04/2018     05/04/2018    CO2 24 05/04/2018    BUN 29 (H) 05/04/2018    CREATININE 0.85 05/04/2018    GLUCOSE 68 (L) 05/04/2018    CALCIUM 9.0 05/04/2018    PROT 6.8 05/04/2018    LABALBU 4.4 05/04/2018    BILITOT 0.3 05/04/2018    ALKPHOS 87 05/04/2018    AST 14 05/04/2018    ALT 15 05/04/2018    LABGLOM >60.0 05/04/2018    GFRAA >60.0 05/04/2018    GLOB 2.4 05/04/2018     Lab Results   Component Value Date    WBC 9.8 05/04/2018    HGB 13.5 05/04/2018    HCT 40.0 05/04/2018    MCV 95.3 05/04/2018     05/04/2018     Lab Results   Component Value Date    LABA1C 5.5 05/04/2018    LABA1C 5.6 03/27/2014     Lab Results   Component Value Date    HDL 55 05/04/2018    HDL 66 (H) 04/08/2015    HDL 53 03/27/2014    LDLCALC 138 (H) 05/04/2018    LDLCALC 148 (H) 04/08/2015    LDLCALC 119 03/27/2014    CHOL 224 (H) 05/04/2018    CHOL 237 (H) 04/08/2015    CHOL 198 03/27/2014    TRIG 153 05/04/2018    TRIG 117 04/08/2015    TRIG 130 03/27/2014     No results found for: TESTM  Lab Results   Component Value Date    TSH 1.540 05/04/2018    TSH 3.050 04/08/2015    T4FREE 1.30 05/04/2018    T4FREE 0.88 (L) 04/08/2015     No results found for: TPOABS    Review of Systems   Constitutional: Positive for fatigue. Cardiovascular: Negative. Gastrointestinal: Positive for constipation. Endocrine: Negative. Objective:   Physical Exam  Vitals reviewed. Constitutional:       Appearance: Normal appearance. She is obese. HENT:      Head: Normocephalic and atraumatic. Right Ear: External ear normal.      Left Ear: External ear normal.      Nose: Nose normal.   Eyes:      General: No scleral icterus. Right eye: No discharge. Left eye: No discharge. Extraocular Movements: Extraocular movements intact. Conjunctiva/sclera: Conjunctivae normal.   Cardiovascular:      Rate and Rhythm: Normal rate. Pulmonary:      Effort: Pulmonary effort is normal.   Musculoskeletal:         General: Normal range of motion. Cervical back: Normal range of motion and neck supple. Neurological:      General: No focal deficit present. Mental Status: She is alert and oriented to person, place, and time.    Psychiatric:         Mood and Affect: Mood normal.         Behavior: Behavior normal.

## 2022-05-06 ENCOUNTER — TELEPHONE (OUTPATIENT)
Dept: ENDOCRINOLOGY | Age: 57
End: 2022-05-06

## 2023-01-31 ENCOUNTER — OFFICE VISIT (OUTPATIENT)
Dept: ENDOCRINOLOGY | Age: 58
End: 2023-01-31

## 2023-01-31 VITALS
OXYGEN SATURATION: 97 % | SYSTOLIC BLOOD PRESSURE: 123 MMHG | DIASTOLIC BLOOD PRESSURE: 79 MMHG | HEIGHT: 64 IN | WEIGHT: 198 LBS | HEART RATE: 96 BPM | BODY MASS INDEX: 33.8 KG/M2

## 2023-01-31 DIAGNOSIS — E66.9 OBESITY (BMI 30-39.9): ICD-10-CM

## 2023-01-31 DIAGNOSIS — R63.5 WEIGHT GAIN: Primary | ICD-10-CM

## 2023-01-31 RX ORDER — PHENTERMINE HYDROCHLORIDE 37.5 MG/1
37.5 TABLET ORAL
Qty: 30 TABLET | Refills: 0 | Status: SHIPPED | OUTPATIENT
Start: 2023-01-31 | End: 2023-03-02

## 2023-01-31 RX ORDER — SEMAGLUTIDE 1.34 MG/ML
INJECTION, SOLUTION SUBCUTANEOUS
Qty: 3 ADJUSTABLE DOSE PRE-FILLED PEN SYRINGE | Refills: 3 | Status: SHIPPED | OUTPATIENT
Start: 2023-01-31

## 2023-01-31 NOTE — PROGRESS NOTES
2023    Assessment:       Diagnosis Orders   1. Weight gain        2. Obesity (BMI 30-39. 9)              PLAN:     Orders Placed This Encounter   Medications    phentermine (ADIPEX-P) 37.5 MG tablet     Sig: Take 1 tablet by mouth every morning (before breakfast) for 30 days. Max Daily Amount: 37.5 mg     Dispense:  30 tablet     Refill:  0    Semaglutide,0.25 or 0.5MG/DOS, (OZEMPIC, 0.25 OR 0.5 MG/DOSE,) 2 MG/1.5ML SOPN     Si.25 mg once a week then 0.5 mg once a week     Dispense:  3 Adjustable Dose Pre-filled Pen Syringe     Refill:  3    Semaglutide,0.25 or 0.5MG/DOS, (OZEMPIC, 0.25 OR 0.5 MG/DOSE,) 2 MG/1.5ML SOPN     Sig: Lot [id number] exp 25     Dispense:  1 Adjustable Dose Pre-filled Pen Syringe     Refill:  0     Given prescription samples of Ozempic started phentermine OARRS report was reviewed follow-up in 4 weeks BMI target less than 30  Subjective:     Chief Complaint   Patient presents with    Weight Gain     medication     Vitals:    23 1152   BP: 123/79   Pulse: 96   SpO2: 97%   Weight: 198 lb (89.8 kg)   Height: 5' 4\" (1.626 m)     Wt Readings from Last 3 Encounters:   23 198 lb (89.8 kg)   22 184 lb (83.5 kg)   22 184 lb (83.5 kg)     BP Readings from Last 3 Encounters:   23 123/79   22 105/70   22 126/84     Follow-up on obesity BMI is 33 wants to start on phentermine also wants to try Ozempic was seen in April of last year has gained 14 pounds since last visit    Other  This is a chronic problem. The current episode started more than 1 year ago. The problem occurs intermittently. The problem has been waxing and waning. Associated symptoms include fatigue. The symptoms are aggravated by eating. Treatments tried: Phentermine.    Past Medical History:   Diagnosis Date    Depression     Other chronic cystitis with hematuria     Renal stones      Past Surgical History:   Procedure Laterality Date    BREAST ENHANCEMENT SURGERY      COLONOSCOPY 2000?    (-)     Social History     Socioeconomic History    Marital status:      Spouse name: Not on file    Number of children: Not on file    Years of education: Not on file    Highest education level: Not on file   Occupational History    Not on file   Tobacco Use    Smoking status: Never    Smokeless tobacco: Never   Substance and Sexual Activity    Alcohol use: Not on file    Drug use: Not on file    Sexual activity: Not on file   Other Topics Concern    Not on file   Social History Narrative    Not on file     Social Determinants of Health     Financial Resource Strain: Not on file   Food Insecurity: Not on file   Transportation Needs: Not on file   Physical Activity: Not on file   Stress: Not on file   Social Connections: Not on file   Intimate Partner Violence: Not on file   Housing Stability: Not on file     Family History   Problem Relation Age of Onset    Cancer Father      Allergies   Allergen Reactions    Tylenol With Codeine #3 [Acetaminophen-Codeine]        Current Outpatient Medications:     lisinopril-hydrochlorothiazide (PRINZIDE;ZESTORETIC) 10-12.5 MG per tablet, TAKE 1 TABLET BY MOUTH DAILY, Disp: 30 tablet, Rfl: 0    ibuprofen (ADVIL;MOTRIN) 800 MG tablet, TAKE 1 TABLET BY MOUTH EVERY 8 HOURS AS NEEDED FOR PAIN, Disp: 90 tablet, Rfl: 1    venlafaxine (EFFEXOR XR) 150 MG extended release capsule, TAKE 1 CAPSULE BY MOUTH DAILY, Disp: 30 capsule, Rfl: 5    ondansetron (ZOFRAN-ODT) 8 MG TBDP disintegrating tablet, TAKE 1 TABLET BY MOUTH EVERY 8 HOURS AS NEEDED FOR NAUSEA OR VOMITING, Disp: 30 tablet, Rfl: 1    levothyroxine (SYNTHROID) 50 MCG tablet, TAKE 1 TABLET BY MOUTH DAILY, Disp: 90 tablet, Rfl: 1    tamsulosin (FLOMAX) 0.4 MG capsule, Take 1 capsule by mouth daily, Disp: 30 capsule, Rfl: 3  Lab Results   Component Value Date     05/04/2018    K 4.5 05/04/2018     05/04/2018    CO2 24 05/04/2018    BUN 29 (H) 05/04/2018    CREATININE 0.85 05/04/2018    GLUCOSE 68 (L) 05/04/2018    CALCIUM 9.0 05/04/2018    PROT 6.8 05/04/2018    LABALBU 4.4 05/04/2018    BILITOT 0.3 05/04/2018    ALKPHOS 87 05/04/2018    AST 14 05/04/2018    ALT 15 05/04/2018    LABGLOM >60.0 05/04/2018    GFRAA >60.0 05/04/2018    GLOB 2.4 05/04/2018     Lab Results   Component Value Date    WBC 9.8 05/04/2018    HGB 13.5 05/04/2018    HCT 40.0 05/04/2018    MCV 95.3 05/04/2018     05/04/2018     Lab Results   Component Value Date    LABA1C 5.5 05/04/2018    LABA1C 5.6 03/27/2014     Lab Results   Component Value Date    HDL 55 05/04/2018    HDL 66 (H) 04/08/2015    HDL 53 03/27/2014    LDLCALC 138 (H) 05/04/2018    LDLCALC 148 (H) 04/08/2015    LDLCALC 119 03/27/2014    CHOL 224 (H) 05/04/2018    CHOL 237 (H) 04/08/2015    CHOL 198 03/27/2014    TRIG 153 05/04/2018    TRIG 117 04/08/2015    TRIG 130 03/27/2014     No results found for: TESTM  Lab Results   Component Value Date    TSH 1.540 05/04/2018    TSH 3.050 04/08/2015    T4FREE 1.30 05/04/2018    T4FREE 0.88 (L) 04/08/2015     No results found for: TPOABS    Review of Systems   Constitutional:  Positive for fatigue. Cardiovascular: Negative. Endocrine: Negative. All other systems reviewed and are negative. Objective:   Physical Exam  Vitals reviewed. Constitutional:       General: She is not in acute distress. Appearance: Normal appearance. She is obese. HENT:      Head: Normocephalic and atraumatic. Right Ear: External ear normal.      Left Ear: External ear normal.      Nose: Nose normal.   Eyes:      General: No scleral icterus. Right eye: No discharge. Left eye: No discharge. Extraocular Movements: Extraocular movements intact. Conjunctiva/sclera: Conjunctivae normal.   Cardiovascular:      Rate and Rhythm: Normal rate. Pulmonary:      Effort: Pulmonary effort is normal.   Musculoskeletal:         General: Normal range of motion. Cervical back: Normal range of motion and neck supple. Neurological:      General: No focal deficit present. Mental Status: She is alert and oriented to person, place, and time.    Psychiatric:         Mood and Affect: Mood normal.         Behavior: Behavior normal.

## 2023-02-05 RX ORDER — SEMAGLUTIDE 1.34 MG/ML
INJECTION, SOLUTION SUBCUTANEOUS
Qty: 1 ADJUSTABLE DOSE PRE-FILLED PEN SYRINGE | Refills: 0 | COMMUNITY
Start: 2023-02-05

## 2023-02-28 ENCOUNTER — OFFICE VISIT (OUTPATIENT)
Dept: FAMILY MEDICINE CLINIC | Age: 58
End: 2023-02-28

## 2023-02-28 VITALS
DIASTOLIC BLOOD PRESSURE: 78 MMHG | HEART RATE: 104 BPM | BODY MASS INDEX: 32.95 KG/M2 | TEMPERATURE: 96.8 F | WEIGHT: 193 LBS | OXYGEN SATURATION: 98 % | SYSTOLIC BLOOD PRESSURE: 120 MMHG | HEIGHT: 64 IN

## 2023-02-28 DIAGNOSIS — E66.9 OBESITY (BMI 30-39.9): ICD-10-CM

## 2023-02-28 DIAGNOSIS — R63.5 WEIGHT GAIN: Primary | ICD-10-CM

## 2023-02-28 RX ORDER — DULOXETIN HYDROCHLORIDE 60 MG/1
CAPSULE, DELAYED RELEASE ORAL
COMMUNITY
Start: 2020-07-23

## 2023-02-28 RX ORDER — BUPROPION HYDROCHLORIDE 200 MG/1
TABLET, EXTENDED RELEASE ORAL
COMMUNITY
Start: 2023-02-27

## 2023-02-28 RX ORDER — PHENTERMINE HYDROCHLORIDE 37.5 MG/1
37.5 TABLET ORAL
Qty: 30 TABLET | Refills: 0 | Status: SHIPPED | OUTPATIENT
Start: 2023-02-28 | End: 2023-03-30

## 2023-02-28 ASSESSMENT — ENCOUNTER SYMPTOMS
WHEEZING: 0
VOMITING: 0
NAUSEA: 0
SHORTNESS OF BREATH: 0
DIARRHEA: 0
COUGH: 0
CHEST TIGHTNESS: 0
ABDOMINAL PAIN: 0

## 2023-02-28 NOTE — PROGRESS NOTES
Subjective:      Patient ID: Ghassan Ansari is a 56 y.o. female who presents today for:  Chief Complaint   Patient presents with    Medication Refill       HPI  Patient is here for Adipex follow up. She has been on Adipex for the last 1 month and she has lost 5 lbs. Says she been on Adipex in the past and has has some results in the past.   Says she has less of an appetite and she has more energy. Says she has been reducing sugar/soda. Says she is eating smaller portions. Says she is eating more fruits, meats, and veggies. Says she has not started to exercise yet. Says she would like the script printed. Says she has not had any SE from the medications.      Past Medical History:   Diagnosis Date    Depression     Other chronic cystitis with hematuria     Renal stones      Past Surgical History:   Procedure Laterality Date    BREAST ENHANCEMENT SURGERY  2007    COLONOSCOPY  2000?    (-)     Social History     Socioeconomic History    Marital status:      Spouse name: Not on file    Number of children: Not on file    Years of education: Not on file    Highest education level: Not on file   Occupational History    Not on file   Tobacco Use    Smoking status: Never    Smokeless tobacco: Never   Substance and Sexual Activity    Alcohol use: Not on file    Drug use: Not on file    Sexual activity: Not on file   Other Topics Concern    Not on file   Social History Narrative    Not on file     Social Determinants of Health     Financial Resource Strain: Not on file   Food Insecurity: Not on file   Transportation Needs: Not on file   Physical Activity: Not on file   Stress: Not on file   Social Connections: Not on file   Intimate Partner Violence: Not on file   Housing Stability: Not on file     Family History   Problem Relation Age of Onset    Cancer Father      Allergies   Allergen Reactions    Tylenol With Codeine #3 [Acetaminophen-Codeine]      Current Outpatient Medications   Medication Sig Dispense Refill    buPROPion (WELLBUTRIN SR) 200 MG extended release tablet       DULoxetine (CYMBALTA) 60 MG extended release capsule Take by mouth      phentermine (ADIPEX-P) 37.5 MG tablet Take 1 tablet by mouth every morning (before breakfast) for 30 days. Max Daily Amount: 37.5 mg 30 tablet 0    lisinopril-hydrochlorothiazide (PRINZIDE;ZESTORETIC) 10-12.5 MG per tablet TAKE 1 TABLET BY MOUTH DAILY 30 tablet 0    ibuprofen (ADVIL;MOTRIN) 800 MG tablet TAKE 1 TABLET BY MOUTH EVERY 8 HOURS AS NEEDED FOR PAIN 90 tablet 1    venlafaxine (EFFEXOR XR) 150 MG extended release capsule TAKE 1 CAPSULE BY MOUTH DAILY 30 capsule 5    ondansetron (ZOFRAN-ODT) 8 MG TBDP disintegrating tablet TAKE 1 TABLET BY MOUTH EVERY 8 HOURS AS NEEDED FOR NAUSEA OR VOMITING 30 tablet 1    levothyroxine (SYNTHROID) 50 MCG tablet TAKE 1 TABLET BY MOUTH DAILY 90 tablet 1    tamsulosin (FLOMAX) 0.4 MG capsule Take 1 capsule by mouth daily (Patient not taking: Reported on 2/28/2023) 30 capsule 3     No current facility-administered medications for this visit. Review of Systems   Constitutional:  Negative for activity change, appetite change, chills, diaphoresis, fatigue, fever and unexpected weight change. Respiratory:  Negative for cough, chest tightness, shortness of breath and wheezing. Cardiovascular:  Negative for chest pain, palpitations and leg swelling. Gastrointestinal:  Negative for abdominal pain, diarrhea, nausea and vomiting. Neurological:  Negative for dizziness, weakness, light-headedness and headaches. Hematological:  Negative for adenopathy. Objective:   /78   Pulse (!) 104   Temp 96.8 °F (36 °C) (Temporal)   Ht 5' 4\" (1.626 m) Comment: per pt  Wt 193 lb (87.5 kg)   SpO2 98%   BMI 33.13 kg/m²     Physical Exam  Vitals reviewed. Constitutional:       General: She is awake. She is not in acute distress. Appearance: Normal appearance. She is well-developed, well-groomed and normal weight.  She is not ill-appearing, toxic-appearing or diaphoretic. HENT:      Head: Normocephalic and atraumatic. Right Ear: Hearing normal.      Left Ear: Hearing normal.      Mouth/Throat:      Lips: Pink. Eyes:      General: Lids are normal.      Extraocular Movements: Extraocular movements intact. Conjunctiva/sclera: Conjunctivae normal.   Neck:      Trachea: Trachea normal.   Cardiovascular:      Rate and Rhythm: Normal rate and regular rhythm. Pulses: Normal pulses. Heart sounds: Normal heart sounds, S1 normal and S2 normal.   Pulmonary:      Effort: Pulmonary effort is normal.      Breath sounds: Normal breath sounds and air entry. Musculoskeletal:      Cervical back: Normal range of motion and neck supple. Skin:     General: Skin is warm and dry. Capillary Refill: Capillary refill takes less than 2 seconds. Neurological:      General: No focal deficit present. Mental Status: She is alert and oriented to person, place, and time. Mental status is at baseline. Psychiatric:         Attention and Perception: Attention and perception normal.         Mood and Affect: Mood and affect normal.         Speech: Speech normal.         Behavior: Behavior normal. Behavior is cooperative. Thought Content: Thought content normal.         Cognition and Memory: Cognition and memory normal.         Judgment: Judgment normal.       Assessment:       Diagnosis Orders   1. Weight gain  phentermine (ADIPEX-P) 37.5 MG tablet      2. Obesity (BMI 30-39.9)  phentermine (ADIPEX-P) 37.5 MG tablet        No results found for this visit on 02/28/23. Plan:     Assessment & Plan   Luba Shah was seen today for medication refill. Diagnoses and all orders for this visit:    Weight gain  -     phentermine (ADIPEX-P) 37.5 MG tablet; Take 1 tablet by mouth every morning (before breakfast) for 30 days. Max Daily Amount: 37.5 mg    Obesity (BMI 30-39.9)  -     phentermine (ADIPEX-P) 37.5 MG tablet;  Take 1 tablet by mouth every morning (before breakfast) for 30 days. Max Daily Amount: 37.5 mg    Will cont Adipex for additional 1 month and patient will follow up with Dr. Miranda Gilliland. Advised to cont with diet changes and start regular exercise. Discussed to report any SE or concerns. She is aware of risk and SE. No orders of the defined types were placed in this encounter. Orders Placed This Encounter   Medications    phentermine (ADIPEX-P) 37.5 MG tablet     Sig: Take 1 tablet by mouth every morning (before breakfast) for 30 days. Max Daily Amount: 37.5 mg     Dispense:  30 tablet     Refill:  0     BMI 33.13     Medications Discontinued During This Encounter   Medication Reason    Semaglutide,0.25 or 0.5MG/DOS, (OZEMPIC, 0.25 OR 0.5 MG/DOSE,) 2 MG/1.5ML SOPN LIST CLEANUP    Semaglutide,0.25 or 0.5MG/DOS, (OZEMPIC, 0.25 OR 0.5 MG/DOSE,) 2 MG/1.5ML SOPN LIST CLEANUP    phentermine (ADIPEX-P) 37.5 MG tablet REORDER     Return for Dr Miranda Gilliland 1 month. Reviewed with the patient/family: current clinical status & medications. Side effects of the medication prescribed today, as well as treatment plan/rationale and result expectations have been discussed with the patient/family who expresses understanding. Patient will be discharged home in stable condition. Follow up with PCP to evaluate treatment results or return if symptoms worsen or fail to improve. Discussed signs and symptoms which require immediate follow-up in ED/call to 911. Understanding verbalized. I have reviewed the patient's medical history in detail and updated the computerized patient record.     Pina Holguin, APRN - CNP

## 2023-03-01 PROBLEM — R53.83 FATIGUE: Status: ACTIVE | Noted: 2023-03-01

## 2023-03-01 PROBLEM — E66.09 CLASS 1 OBESITY DUE TO EXCESS CALORIES WITH SERIOUS COMORBIDITY AND BODY MASS INDEX (BMI) OF 34.0 TO 34.9 IN ADULT: Status: ACTIVE | Noted: 2023-03-01

## 2023-03-01 PROBLEM — D23.39 DERMOID CYST OF FOREHEAD: Status: ACTIVE | Noted: 2023-03-01

## 2023-03-01 PROBLEM — D50.9 IRON DEFICIENCY ANEMIA: Status: ACTIVE | Noted: 2023-03-01

## 2023-03-01 PROBLEM — R22.0 MASS OF SKIN OF HEAD: Status: ACTIVE | Noted: 2023-03-01

## 2023-03-01 PROBLEM — N95.1 MENOPAUSAL SYMPTOMS: Status: ACTIVE | Noted: 2023-03-01

## 2023-03-01 PROBLEM — R22.0 SUBCUTANEOUS MASS OF HEAD: Status: ACTIVE | Noted: 2023-03-01

## 2023-03-01 PROBLEM — R31.9 HEMATURIA: Status: ACTIVE | Noted: 2023-03-01

## 2023-03-01 PROBLEM — R39.9 UTI SYMPTOMS: Status: ACTIVE | Noted: 2023-03-01

## 2023-03-01 PROBLEM — D17.9 LIPOMA: Status: ACTIVE | Noted: 2023-03-01

## 2023-03-01 PROBLEM — F32.A DEPRESSION: Status: ACTIVE | Noted: 2023-03-01

## 2023-03-01 PROBLEM — N95.1 HOT FLASHES, MENOPAUSAL: Status: ACTIVE | Noted: 2023-03-01

## 2023-03-01 PROBLEM — E66.9 OBESE: Status: ACTIVE | Noted: 2023-03-01

## 2023-03-01 PROBLEM — N20.0 NEPHROLITHIASIS: Status: ACTIVE | Noted: 2023-03-01

## 2023-03-01 PROBLEM — E03.9 HYPOTHYROIDISM, ADULT: Status: ACTIVE | Noted: 2023-03-01

## 2023-03-01 PROBLEM — E66.811 CLASS 1 OBESITY DUE TO EXCESS CALORIES WITH SERIOUS COMORBIDITY AND BODY MASS INDEX (BMI) OF 34.0 TO 34.9 IN ADULT: Status: ACTIVE | Noted: 2023-03-01

## 2023-03-01 PROBLEM — D64.9 ANEMIA: Status: ACTIVE | Noted: 2023-03-01

## 2023-03-01 PROBLEM — E55.9 VITAMIN D DEFICIENCY: Status: ACTIVE | Noted: 2023-03-01

## 2023-03-01 PROBLEM — I10 ESSENTIAL HYPERTENSION: Status: ACTIVE | Noted: 2023-03-01

## 2023-03-01 RX ORDER — LISINOPRIL AND HYDROCHLOROTHIAZIDE 10; 12.5 MG/1; MG/1
1 TABLET ORAL
COMMUNITY
Start: 2019-08-13 | End: 2023-03-09 | Stop reason: SDUPTHER

## 2023-03-01 RX ORDER — BUPROPION HYDROCHLORIDE 200 MG/1
1 TABLET, EXTENDED RELEASE ORAL 2 TIMES DAILY
COMMUNITY
Start: 2021-02-24 | End: 2023-03-09 | Stop reason: SDUPTHER

## 2023-03-01 RX ORDER — DULOXETIN HYDROCHLORIDE 60 MG/1
1 CAPSULE, DELAYED RELEASE ORAL
COMMUNITY
Start: 2020-07-23 | End: 2023-03-09 | Stop reason: SDUPTHER

## 2023-03-01 RX ORDER — OXYCODONE AND ACETAMINOPHEN 5; 325 MG/1; MG/1
TABLET ORAL
COMMUNITY
End: 2023-03-09 | Stop reason: ALTCHOICE

## 2023-03-01 RX ORDER — ONDANSETRON 8 MG/1
8 TABLET, ORALLY DISINTEGRATING ORAL EVERY 8 HOURS PRN
COMMUNITY
Start: 2019-12-17 | End: 2023-03-09 | Stop reason: SDUPTHER

## 2023-03-01 RX ORDER — LEVOTHYROXINE SODIUM 50 UG/1
1 TABLET ORAL DAILY
COMMUNITY
Start: 2019-04-17 | End: 2023-03-09 | Stop reason: SDUPTHER

## 2023-03-06 ENCOUNTER — TELEPHONE (OUTPATIENT)
Dept: ENDOCRINOLOGY | Age: 58
End: 2023-03-06

## 2023-03-08 ENCOUNTER — APPOINTMENT (OUTPATIENT)
Dept: LAB | Facility: LAB | Age: 58
End: 2023-03-08
Payer: COMMERCIAL

## 2023-03-08 LAB
ERYTHROCYTE DISTRIBUTION WIDTH (RATIO) BY AUTOMATED COUNT: 18.8 % (ref 11.5–14.5)
ERYTHROCYTE MEAN CORPUSCULAR HEMOGLOBIN CONCENTRATION (G/DL) BY AUTOMATED: 30.7 G/DL (ref 32–36)
ERYTHROCYTE MEAN CORPUSCULAR VOLUME (FL) BY AUTOMATED COUNT: 86 FL (ref 80–100)
ERYTHROCYTES (10*6/UL) IN BLOOD BY AUTOMATED COUNT: 4.57 X10E12/L (ref 4–5.2)
HEMATOCRIT (%) IN BLOOD BY AUTOMATED COUNT: 39.4 % (ref 36–46)
HEMOGLOBIN (G/DL) IN BLOOD: 12.1 G/DL (ref 12–16)
IRON (UG/DL) IN SER/PLAS: 69 UG/DL (ref 35–150)
IRON BINDING CAPACITY (UG/DL) IN SER/PLAS: 416 UG/DL (ref 240–445)
IRON SATURATION (%) IN SER/PLAS: 17 % (ref 25–45)
LEUKOCYTES (10*3/UL) IN BLOOD BY AUTOMATED COUNT: 6.5 X10E9/L (ref 4.4–11.3)
PLATELETS (10*3/UL) IN BLOOD AUTOMATED COUNT: 364 X10E9/L (ref 150–450)

## 2023-03-09 ENCOUNTER — OFFICE VISIT (OUTPATIENT)
Dept: PRIMARY CARE | Facility: CLINIC | Age: 58
End: 2023-03-09
Payer: COMMERCIAL

## 2023-03-09 VITALS
DIASTOLIC BLOOD PRESSURE: 78 MMHG | HEART RATE: 87 BPM | OXYGEN SATURATION: 99 % | TEMPERATURE: 97.7 F | HEIGHT: 60 IN | SYSTOLIC BLOOD PRESSURE: 128 MMHG | BODY MASS INDEX: 38.08 KG/M2 | RESPIRATION RATE: 16 BRPM

## 2023-03-09 DIAGNOSIS — K29.00 OTHER ACUTE GASTRITIS WITHOUT HEMORRHAGE: ICD-10-CM

## 2023-03-09 DIAGNOSIS — D50.9 IRON DEFICIENCY ANEMIA, UNSPECIFIED IRON DEFICIENCY ANEMIA TYPE: ICD-10-CM

## 2023-03-09 DIAGNOSIS — E66.01 CLASS 2 SEVERE OBESITY DUE TO EXCESS CALORIES WITH SERIOUS COMORBIDITY AND BODY MASS INDEX (BMI) OF 38.0 TO 38.9 IN ADULT (MULTI): ICD-10-CM

## 2023-03-09 DIAGNOSIS — I27.0 PRIMARY PULMONARY HTN (MULTI): ICD-10-CM

## 2023-03-09 DIAGNOSIS — N95.1 MENOPAUSAL SYNDROME (HOT FLASHES): ICD-10-CM

## 2023-03-09 DIAGNOSIS — F33.41 RECURRENT MAJOR DEPRESSIVE DISORDER, IN PARTIAL REMISSION (CMS-HCC): ICD-10-CM

## 2023-03-09 DIAGNOSIS — R11.0 NAUSEA: ICD-10-CM

## 2023-03-09 DIAGNOSIS — E03.8 HYPOTHYROIDISM DUE TO HASHIMOTO'S THYROIDITIS: ICD-10-CM

## 2023-03-09 DIAGNOSIS — E06.3 HYPOTHYROIDISM DUE TO HASHIMOTO'S THYROIDITIS: ICD-10-CM

## 2023-03-09 DIAGNOSIS — K21.9 GASTROESOPHAGEAL REFLUX DISEASE WITHOUT ESOPHAGITIS: Primary | ICD-10-CM

## 2023-03-09 PROCEDURE — 3078F DIAST BP <80 MM HG: CPT | Performed by: PHYSICIAN ASSISTANT

## 2023-03-09 PROCEDURE — 3074F SYST BP LT 130 MM HG: CPT | Performed by: PHYSICIAN ASSISTANT

## 2023-03-09 PROCEDURE — 3008F BODY MASS INDEX DOCD: CPT | Performed by: PHYSICIAN ASSISTANT

## 2023-03-09 PROCEDURE — 99214 OFFICE O/P EST MOD 30 MIN: CPT | Performed by: PHYSICIAN ASSISTANT

## 2023-03-09 RX ORDER — ONDANSETRON 8 MG/1
8 TABLET, ORALLY DISINTEGRATING ORAL EVERY 8 HOURS PRN
Qty: 30 TABLET | Refills: 3 | Status: SHIPPED | OUTPATIENT
Start: 2023-03-09

## 2023-03-09 RX ORDER — LISINOPRIL AND HYDROCHLOROTHIAZIDE 10; 12.5 MG/1; MG/1
1 TABLET ORAL
Qty: 30 TABLET | Refills: 3 | Status: SHIPPED | OUTPATIENT
Start: 2023-03-09 | End: 2023-09-11

## 2023-03-09 RX ORDER — PHENTERMINE HYDROCHLORIDE 37.5 MG/1
37.5 TABLET ORAL
COMMUNITY

## 2023-03-09 RX ORDER — BUPROPION HYDROCHLORIDE 200 MG/1
200 TABLET, EXTENDED RELEASE ORAL 2 TIMES DAILY
Qty: 60 TABLET | Refills: 3 | Status: SHIPPED | OUTPATIENT
Start: 2023-03-09 | End: 2024-01-04

## 2023-03-09 RX ORDER — OMEPRAZOLE 40 MG/1
40 CAPSULE, DELAYED RELEASE ORAL
Qty: 30 CAPSULE | Refills: 11 | Status: SHIPPED | OUTPATIENT
Start: 2023-03-09 | End: 2024-03-08

## 2023-03-09 RX ORDER — NALTREXONE HYDROCHLORIDE 50 MG/1
25 TABLET, FILM COATED ORAL DAILY
Qty: 15 TABLET | Refills: 2 | Status: SHIPPED | OUTPATIENT
Start: 2023-03-09 | End: 2023-06-07

## 2023-03-09 RX ORDER — LEVOTHYROXINE SODIUM 50 UG/1
50 TABLET ORAL
Qty: 30 TABLET | Refills: 3 | Status: SHIPPED | OUTPATIENT
Start: 2023-03-09 | End: 2023-09-11

## 2023-03-09 RX ORDER — DULOXETIN HYDROCHLORIDE 60 MG/1
60 CAPSULE, DELAYED RELEASE ORAL
Qty: 30 CAPSULE | Refills: 3 | Status: SHIPPED | OUTPATIENT
Start: 2023-03-09 | End: 2024-02-12

## 2023-03-09 RX ORDER — ESTRADIOL/NORETHINDRONE ACETATE TRANSDERMAL SYSTEM .05; .14 MG/D; MG/D
1 PATCH, EXTENDED RELEASE TRANSDERMAL 2 TIMES WEEKLY
Qty: 8 PATCH | Refills: 11 | Status: SHIPPED | OUTPATIENT
Start: 2023-03-09 | End: 2024-03-08

## 2023-03-09 NOTE — PROGRESS NOTES
Subjective   Patient ID: Suzette Carey is a 57 y.o. female who presents for Follow-up (Pt here today for refills and recently had lab work done yesterday and wants to go over results and wanting to know if she needs more iron infusions. Pt is seeing Dr Stanley and he has her on Adipex for weight loss that she started last month. Pt wants to know if she needs all labs check, including TSH?), Med Refill, and Heartburn (Pt wants a medication for her acid reflux in prescription form).    HPI   Iron deficiency anemia  - improving since she got the iron infusions     Hypothyroidism  - Currently taking levothyroxine 50mcg daily   - Last TSH in August was normal     Postmenopausal  - Last period 5 years ago   - Having bad hot flashes - very bothersome     Obese:  - Seeing Dr. Stanley and on Adipex-P right now   - Stopped pop  - Going to the gym now with her , walking 2 miles   - Has already lost 5lbs     GERD:  - Onset: long time   - Triggers: every time she eats   - Txs tried: OTC meds   - Prior EGD:         Review of Systems   Constitutional:  Positive for fatigue.   Gastrointestinal:  Positive for abdominal pain.       Objective   /78   Pulse 87   Temp 36.5 °C (97.7 °F)   Resp 16   Ht 1.524 m (5')   SpO2 99%   BMI 38.08 kg/m²     Physical Exam  Constitutional:       Appearance: Normal appearance.   Cardiovascular:      Rate and Rhythm: Normal rate and regular rhythm.      Pulses: Normal pulses.      Heart sounds: Normal heart sounds. No murmur heard.  Pulmonary:      Effort: Pulmonary effort is normal.      Breath sounds: Normal breath sounds.   Abdominal:      Tenderness: There is abdominal tenderness in the epigastric area.   Neurological:      Mental Status: She is alert.   Psychiatric:         Mood and Affect: Mood and affect normal.         Assessment/Plan   Problem List Items Addressed This Visit    None  IRON DEFICIENCY ANEMIA:  - Labs reviewed - improving since getting iron infusions   - Will  continue to monitor - check in 6 months   - Encouraged she increase iron in her diet     GERD:  - Rx sent for omeprazole 40mg daily   - Patient was advised to eliminate dietary triggers (tomato based foods, citrus, spicy foods, chocolate, caffeine, food with high fat content, carbonated beverages, and peppermint).   - Eliminate NSAIDs  - Patient was advised to lose weight.   - Patient was advised to elevate the head of the bed either by putting 6 to 8 inch blocks at the head of the bed or a Styrofoam wedge under the mattress.   - Patient was advised to avoid lying down 30 to 60 minutes after eating.   - Referral to GI placed for further eval   - Pt expressed understanding and agreed to the plan.     OBESITY:   - BMI is 38.08 kg/m2  - Other cardiac risk factors: HTN   - Pt is aware of the health benefits of lower body weight and the risks associated with obesity.   - Pt advised to increase physical activity with a goal of 30 minutes of cardiovascular/ aerobic exercise daily.   - Pt advised to improve the diet - less fatty foods, red meat and sweets,  more fruits, vegetables, fish, and whole grains.   - Will add naltrexone to hopefully boost weight loss effects of bupropion.  - All the pt's questions were answered. The pt expressed understanding and agreed to the plan.     HYPOTHYROIDISM:  - Has been stable on her current dose of levothyroxine 50mcg daily   - Continue current tx plan   - Monitor annually     MENOPAUSAL HOT FLASHES:  - Pt would like to try hormone replacement therapy - rx for Combipatch sent       Follow up 6 months

## 2023-03-15 ASSESSMENT — ENCOUNTER SYMPTOMS
ABDOMINAL PAIN: 1
FATIGUE: 1

## 2023-04-12 ENCOUNTER — TELEPHONE (OUTPATIENT)
Dept: ENDOCRINOLOGY | Age: 58
End: 2023-04-12

## 2023-04-14 ENCOUNTER — APPOINTMENT (OUTPATIENT)
Dept: CT IMAGING | Age: 58
End: 2023-04-14
Payer: COMMERCIAL

## 2023-04-14 ENCOUNTER — HOSPITAL ENCOUNTER (EMERGENCY)
Age: 58
Discharge: HOME | End: 2023-04-14
Payer: COMMERCIAL

## 2023-04-14 VITALS
HEIGHT: 64 IN | RESPIRATION RATE: 18 BRPM | DIASTOLIC BLOOD PRESSURE: 89 MMHG | WEIGHT: 192 LBS | HEART RATE: 98 BPM | OXYGEN SATURATION: 96 % | SYSTOLIC BLOOD PRESSURE: 151 MMHG | TEMPERATURE: 97.9 F | BODY MASS INDEX: 32.78 KG/M2

## 2023-04-14 DIAGNOSIS — K59.00 CONSTIPATION, UNSPECIFIED CONSTIPATION TYPE: ICD-10-CM

## 2023-04-14 DIAGNOSIS — N30.01 ACUTE CYSTITIS WITH HEMATURIA: Primary | ICD-10-CM

## 2023-04-14 LAB
ALBUMIN SERPL-MCNC: 4.6 G/DL (ref 3.5–4.6)
ALP SERPL-CCNC: 102 U/L (ref 40–130)
ALT SERPL-CCNC: 16 U/L (ref 0–33)
ANION GAP SERPL CALCULATED.3IONS-SCNC: 11 MEQ/L (ref 9–15)
AST SERPL-CCNC: 17 U/L (ref 0–35)
BACTERIA URNS QL MICRO: ABNORMAL /HPF
BASOPHILS # BLD: 0 K/UL (ref 0–0.2)
BASOPHILS NFR BLD: 0.4 %
BILIRUB SERPL-MCNC: 0.3 MG/DL (ref 0.2–0.7)
BILIRUB UR QL STRIP: NEGATIVE
BUN SERPL-MCNC: 20 MG/DL (ref 6–20)
CALCIUM SERPL-MCNC: 9.3 MG/DL (ref 8.5–9.9)
CHLORIDE SERPL-SCNC: 100 MEQ/L (ref 95–107)
CLARITY UR: CLEAR
CO2 SERPL-SCNC: 26 MEQ/L (ref 20–31)
COLOR UR: YELLOW
CREAT SERPL-MCNC: 0.9 MG/DL (ref 0.5–0.9)
EOSINOPHIL # BLD: 0.1 K/UL (ref 0–0.7)
EOSINOPHIL NFR BLD: 1.8 %
EPI CELLS #/AREA URNS AUTO: ABNORMAL /HPF (ref 0–5)
ERYTHROCYTE [DISTWIDTH] IN BLOOD BY AUTOMATED COUNT: 19.5 % (ref 11.5–14.5)
GLOBULIN SER CALC-MCNC: 2.5 G/DL (ref 2.3–3.5)
GLUCOSE SERPL-MCNC: 100 MG/DL (ref 70–99)
GLUCOSE UR STRIP-MCNC: NEGATIVE MG/DL
HCT VFR BLD AUTO: 40 % (ref 37–47)
HGB BLD-MCNC: 13.1 G/DL (ref 12–16)
HGB UR QL STRIP: ABNORMAL
HYALINE CASTS #/AREA URNS AUTO: ABNORMAL /HPF (ref 0–5)
KETONES UR STRIP-MCNC: NEGATIVE MG/DL
LACTATE BLDV-SCNC: 2 MMOL/L (ref 0.5–2.2)
LEUKOCYTE ESTERASE UR QL STRIP: ABNORMAL
LIPASE SERPL-CCNC: 24 U/L (ref 12–95)
LYMPHOCYTES # BLD: 1.5 K/UL (ref 1–4.8)
LYMPHOCYTES NFR BLD: 18.1 %
MCH RBC QN AUTO: 27.3 PG (ref 27–31.3)
MCHC RBC AUTO-ENTMCNC: 32.9 % (ref 33–37)
MCV RBC AUTO: 83.1 FL (ref 79.4–94.8)
MONOCYTES # BLD: 0.8 K/UL (ref 0.2–0.8)
MONOCYTES NFR BLD: 10.4 %
NEUTROPHILS # BLD: 5.6 K/UL (ref 1.4–6.5)
NEUTS SEG NFR BLD: 69.3 %
NITRITE UR QL STRIP: NEGATIVE
PERFORMED ON: NORMAL
PH UR STRIP: 6.5 [PH] (ref 5–9)
PLATELET # BLD AUTO: 392 K/UL (ref 130–400)
POC CREATININE WHOLE BLOOD: 0.9
POC CREATININE: 1 MG/DL (ref 0.6–1.2)
POC SAMPLE TYPE: NORMAL
POTASSIUM SERPL-SCNC: 3.4 MEQ/L (ref 3.4–4.9)
PROT SERPL-MCNC: 7.1 G/DL (ref 6.3–8)
PROT UR STRIP-MCNC: NEGATIVE MG/DL
RBC # BLD AUTO: 4.81 M/UL (ref 4.2–5.4)
RBC #/AREA URNS AUTO: ABNORMAL /HPF (ref 0–5)
SODIUM SERPL-SCNC: 137 MEQ/L (ref 135–144)
SP GR UR STRIP: 1.03 (ref 1–1.03)
URINE REFLEX TO CULTURE: ABNORMAL
UROBILINOGEN UR STRIP-ACNC: 0.2 E.U./DL
WBC # BLD AUTO: 8.1 K/UL (ref 4.8–10.8)
WBC #/AREA URNS AUTO: ABNORMAL /HPF (ref 0–5)

## 2023-04-14 PROCEDURE — 6360000004 HC RX CONTRAST MEDICATION

## 2023-04-14 PROCEDURE — 80053 COMPREHEN METABOLIC PANEL: CPT

## 2023-04-14 PROCEDURE — 85025 COMPLETE CBC W/AUTO DIFF WBC: CPT

## 2023-04-14 PROCEDURE — 96375 TX/PRO/DX INJ NEW DRUG ADDON: CPT

## 2023-04-14 PROCEDURE — 83690 ASSAY OF LIPASE: CPT

## 2023-04-14 PROCEDURE — 99285 EMERGENCY DEPT VISIT HI MDM: CPT

## 2023-04-14 PROCEDURE — 81001 URINALYSIS AUTO W/SCOPE: CPT

## 2023-04-14 PROCEDURE — 36415 COLL VENOUS BLD VENIPUNCTURE: CPT

## 2023-04-14 PROCEDURE — 2580000003 HC RX 258

## 2023-04-14 PROCEDURE — 6370000000 HC RX 637 (ALT 250 FOR IP)

## 2023-04-14 PROCEDURE — 96374 THER/PROPH/DIAG INJ IV PUSH: CPT

## 2023-04-14 PROCEDURE — 6360000002 HC RX W HCPCS

## 2023-04-14 PROCEDURE — 83605 ASSAY OF LACTIC ACID: CPT

## 2023-04-14 PROCEDURE — 74177 CT ABD & PELVIS W/CONTRAST: CPT

## 2023-04-14 RX ORDER — ONDANSETRON 4 MG/1
4 TABLET, ORALLY DISINTEGRATING ORAL 3 TIMES DAILY PRN
Qty: 21 TABLET | Refills: 0 | Status: SHIPPED | OUTPATIENT
Start: 2023-04-14

## 2023-04-14 RX ORDER — KETOROLAC TROMETHAMINE 30 MG/ML
30 INJECTION, SOLUTION INTRAMUSCULAR; INTRAVENOUS ONCE
Status: COMPLETED | OUTPATIENT
Start: 2023-04-14 | End: 2023-04-14

## 2023-04-14 RX ORDER — POLYETHYLENE GLYCOL 3350 17 G/17G
17 POWDER, FOR SOLUTION ORAL DAILY PRN
Qty: 510 G | Refills: 0 | Status: SHIPPED | OUTPATIENT
Start: 2023-04-14 | End: 2023-05-14

## 2023-04-14 RX ORDER — 0.9 % SODIUM CHLORIDE 0.9 %
1000 INTRAVENOUS SOLUTION INTRAVENOUS ONCE
Status: COMPLETED | OUTPATIENT
Start: 2023-04-14 | End: 2023-04-14

## 2023-04-14 RX ORDER — SULFAMETHOXAZOLE AND TRIMETHOPRIM 800; 160 MG/1; MG/1
1 TABLET ORAL 2 TIMES DAILY
Qty: 20 TABLET | Refills: 0 | Status: SHIPPED | OUTPATIENT
Start: 2023-04-14 | End: 2023-04-24

## 2023-04-14 RX ORDER — ONDANSETRON 2 MG/ML
4 INJECTION INTRAMUSCULAR; INTRAVENOUS ONCE
Status: COMPLETED | OUTPATIENT
Start: 2023-04-14 | End: 2023-04-14

## 2023-04-14 RX ORDER — SULFAMETHOXAZOLE AND TRIMETHOPRIM 800; 160 MG/1; MG/1
1 TABLET ORAL ONCE
Status: COMPLETED | OUTPATIENT
Start: 2023-04-14 | End: 2023-04-14

## 2023-04-14 RX ADMIN — ONDANSETRON 4 MG: 2 INJECTION INTRAMUSCULAR; INTRAVENOUS at 06:58

## 2023-04-14 RX ADMIN — IOPAMIDOL 50 ML: 612 INJECTION, SOLUTION INTRAVENOUS at 07:45

## 2023-04-14 RX ADMIN — SODIUM CHLORIDE 1000 ML: 9 INJECTION, SOLUTION INTRAVENOUS at 06:56

## 2023-04-14 RX ADMIN — SULFAMETHOXAZOLE AND TRIMETHOPRIM 1 TABLET: 800; 160 TABLET ORAL at 10:26

## 2023-04-14 RX ADMIN — KETOROLAC TROMETHAMINE 30 MG: 30 INJECTION, SOLUTION INTRAMUSCULAR at 06:58

## 2023-04-14 ASSESSMENT — ENCOUNTER SYMPTOMS
ALLERGIC/IMMUNOLOGIC NEGATIVE: 1
VOMITING: 1
COUGH: 0
EYE PAIN: 0
SHORTNESS OF BREATH: 0
NAUSEA: 1
DIARRHEA: 0
ABDOMINAL PAIN: 1
CONSTIPATION: 1
EYE ITCHING: 0
EYE DISCHARGE: 0

## 2023-04-14 ASSESSMENT — PAIN DESCRIPTION - LOCATION: LOCATION: ABDOMEN

## 2023-04-14 ASSESSMENT — PAIN SCALES - GENERAL
PAINLEVEL_OUTOF10: 0
PAINLEVEL_OUTOF10: 5

## 2023-04-14 NOTE — ED PROVIDER NOTES
Status: She is alert and oriented to person, place, and time. Mental status is at baseline. Psychiatric:         Mood and Affect: Mood normal.         LABS:  Labs Reviewed   CBC WITH AUTO DIFFERENTIAL - Abnormal; Notable for the following components:       Result Value    MCHC 32.9 (*)     RDW 19.5 (*)     All other components within normal limits   COMPREHENSIVE METABOLIC PANEL - Abnormal; Notable for the following components:    Glucose 100 (*)     All other components within normal limits   URINALYSIS WITH REFLEX TO CULTURE - Abnormal; Notable for the following components:    Blood, Urine TRACE (*)     Leukocyte Esterase, Urine SMALL (*)     All other components within normal limits   MICROSCOPIC URINALYSIS - Abnormal; Notable for the following components:    Bacteria, UA RARE (*)     RBC, UA 6-10 (*)     All other components within normal limits   POCT CREATININE - Normal   LIPASE   LACTIC ACID         MDM:   Vitals:    Vitals:    04/14/23 0800 04/14/23 0818 04/14/23 0830 04/14/23 0900   BP: 125/80 (!) 140/105 138/77 (!) 151/89   Pulse:  98     Resp:  18     Temp:  97.9 °F (36.6 °C)     TempSrc:  Oral     SpO2:  96%     Weight:       Height:           56-year-old female presents to ED for evaluation of abdominal pain. Patient is afebrile, hemodynamically stable, nontoxic. Patient given 1 L IV NS, IV Zofran, IV Toradol while in ED. Labs unremarkable. Urinalysis remarkable for trace blood, small leukocytes. Microscopic urinalysis remarkable for rare bacteria, 6-10 RBC, 3-5 WBC, 6-10 epithelial cells. CT abdomen pelvis per radiologist interpretation demonstrates large hiatal hernia. No CT evidence for acute inflammatory changes within the abdomen and pelvis. Degenerative changes of the facets at L5-S1 with grade 1 anterolisthesis L5 on S1. Patient reassessed following IV pain medications and reports complete resolution of symptoms. Patient is without emesis throughout ED course.   Patient is tolerating

## 2023-04-14 NOTE — ED NOTES
Patient very anxious about having a CT and what the results maybe.  Reassurance given to the patient at this time      Curry Guidry RN  04/14/23 [id number]

## 2023-04-14 NOTE — ED NOTES
Patient sitting in bed with family at the bedside     [facility]  04/14/23 1962

## 2023-04-14 NOTE — DISCHARGE INSTRUCTIONS
Follow-up with GI for colonoscopy. Follow-up with PCP as needed. Return to ED if any new, or worsening symptoms.

## 2023-09-09 DIAGNOSIS — E06.3 HYPOTHYROIDISM DUE TO HASHIMOTO'S THYROIDITIS: ICD-10-CM

## 2023-09-09 DIAGNOSIS — E03.8 HYPOTHYROIDISM DUE TO HASHIMOTO'S THYROIDITIS: ICD-10-CM

## 2023-09-09 DIAGNOSIS — I27.0 PRIMARY PULMONARY HTN (MULTI): ICD-10-CM

## 2023-09-11 RX ORDER — LISINOPRIL AND HYDROCHLOROTHIAZIDE 10; 12.5 MG/1; MG/1
1 TABLET ORAL DAILY
Qty: 90 TABLET | Refills: 1 | Status: SHIPPED | OUTPATIENT
Start: 2023-09-11 | End: 2024-03-27

## 2023-09-11 RX ORDER — LEVOTHYROXINE SODIUM 50 UG/1
50 TABLET ORAL DAILY
Qty: 90 TABLET | Refills: 1 | Status: SHIPPED | OUTPATIENT
Start: 2023-09-11 | End: 2024-04-23

## 2023-10-03 ENCOUNTER — OFFICE VISIT (OUTPATIENT)
Dept: ENDOCRINOLOGY | Age: 58
End: 2023-10-03

## 2023-10-03 VITALS
HEIGHT: 64 IN | DIASTOLIC BLOOD PRESSURE: 74 MMHG | SYSTOLIC BLOOD PRESSURE: 111 MMHG | HEART RATE: 100 BPM | BODY MASS INDEX: 32.95 KG/M2 | WEIGHT: 193 LBS | OXYGEN SATURATION: 95 %

## 2023-10-03 DIAGNOSIS — E66.9 OBESITY (BMI 30-39.9): Primary | ICD-10-CM

## 2023-10-03 DIAGNOSIS — R63.5 WEIGHT GAIN: ICD-10-CM

## 2023-10-03 RX ORDER — SEMAGLUTIDE 0.25 MG/.5ML
0.25 INJECTION, SOLUTION SUBCUTANEOUS
Qty: 4 ML | Refills: 3 | Status: CANCELLED | OUTPATIENT
Start: 2023-10-03

## 2023-10-03 RX ORDER — PHENTERMINE HYDROCHLORIDE 37.5 MG/1
37.5 CAPSULE ORAL EVERY MORNING
Qty: 30 CAPSULE | Refills: 0 | Status: CANCELLED | OUTPATIENT
Start: 2023-10-03 | End: 2023-11-02

## 2023-10-03 RX ORDER — SEMAGLUTIDE 1 MG/.5ML
1 INJECTION, SOLUTION SUBCUTANEOUS
Qty: 4 ML | Refills: 3 | Status: SHIPPED | OUTPATIENT
Start: 2023-10-03

## 2023-10-03 NOTE — PROGRESS NOTES
Right Ear: External ear normal.      Left Ear: External ear normal.      Nose: Nose normal.   Eyes:      General: No scleral icterus. Right eye: No discharge. Left eye: No discharge. Extraocular Movements: Extraocular movements intact. Conjunctiva/sclera: Conjunctivae normal.   Cardiovascular:      Rate and Rhythm: Normal rate. Pulmonary:      Effort: Pulmonary effort is normal.   Musculoskeletal:         General: Normal range of motion. Cervical back: Normal range of motion and neck supple. Neurological:      General: No focal deficit present. Mental Status: She is alert and oriented to person, place, and time.    Psychiatric:         Mood and Affect: Mood normal.         Behavior: Behavior normal.

## 2023-10-23 ENCOUNTER — TELEPHONE (OUTPATIENT)
Dept: ENDOCRINOLOGY | Age: 58
End: 2023-10-23

## 2023-10-23 NOTE — TELEPHONE ENCOUNTER
Pt calling she is having nausea with the wegovy and wants to know if you can call her in something for it and she doesn't want zofran

## 2023-10-24 NOTE — TELEPHONE ENCOUNTER
Pt calling back, she has only taken the wegovy one time and she is not understanding why you would suggest her to see a GI, also she was wondering if you could please call her in a medication for nausea something that is not zofran

## 2023-10-30 RX ORDER — PROCHLORPERAZINE MALEATE 5 MG/1
5 TABLET ORAL EVERY 6 HOURS PRN
Qty: 20 TABLET | Refills: 1 | OUTPATIENT
Start: 2023-10-30

## 2023-11-01 RX ORDER — PROCHLORPERAZINE MALEATE 5 MG/1
5 TABLET ORAL EVERY 6 HOURS PRN
Qty: 20 TABLET | Refills: 1 | Status: SHIPPED | OUTPATIENT
Start: 2023-11-01

## 2023-11-03 ENCOUNTER — TELEPHONE (OUTPATIENT)
Dept: ENDOCRINOLOGY | Age: 58
End: 2023-11-03

## 2023-11-03 RX ORDER — SEMAGLUTIDE 1.7 MG/.75ML
1.7 INJECTION, SOLUTION SUBCUTANEOUS
Qty: 4 ML | Refills: 4 | Status: SHIPPED | OUTPATIENT
Start: 2023-11-03 | End: 2023-12-06 | Stop reason: SDUPTHER

## 2023-11-03 NOTE — TELEPHONE ENCOUNTER
Pt calling her pharmacy is not able to get the wegovy 1mg but they are able to get the 1.7mg dose.  Pt was wondering if you can increase her to the 1.7mg dose and send it into her [facility]

## 2023-11-06 RX ORDER — PROCHLORPERAZINE MALEATE 5 MG/1
5 TABLET ORAL EVERY 6 HOURS PRN
Qty: 20 TABLET | Refills: 1 | OUTPATIENT
Start: 2023-11-06

## 2023-11-21 RX ORDER — PROCHLORPERAZINE MALEATE 5 MG/1
5 TABLET ORAL EVERY 6 HOURS PRN
Qty: 20 TABLET | Refills: 1 | Status: SHIPPED | OUTPATIENT
Start: 2023-11-21

## 2023-11-21 NOTE — TELEPHONE ENCOUNTER
Patient requesting medication refill.  Please approve or deny this request.    Rx requested:  Requested Prescriptions     Pending Prescriptions Disp Refills    prochlorperazine (COMPAZINE) 5 MG tablet 20 tablet 1     Sig: Take 1 tablet by mouth every 6 hours as needed for Nausea         Last Office Visit:   10/3/2023      Next Visit Date:  Future Appointments   Date Time Provider [address] [place] [address]   1/2/2024  1:00 PM Arjun Benito  [facility]

## 2023-11-28 RX ORDER — PROCHLORPERAZINE MALEATE 5 MG/1
5 TABLET ORAL EVERY 6 HOURS PRN
Qty: 20 TABLET | Refills: 1 | OUTPATIENT
Start: 2023-11-28

## 2023-11-28 RX ORDER — PROCHLORPERAZINE MALEATE 5 MG/1
5 TABLET ORAL EVERY 6 HOURS PRN
Qty: 20 TABLET | Refills: 1 | Status: SHIPPED | OUTPATIENT
Start: 2023-11-28

## 2023-12-04 RX ORDER — PROCHLORPERAZINE MALEATE 5 MG/1
5 TABLET ORAL EVERY 6 HOURS PRN
Qty: 20 TABLET | Refills: 1 | Status: SHIPPED | OUTPATIENT
Start: 2023-12-04

## 2023-12-06 RX ORDER — SEMAGLUTIDE 1.7 MG/.75ML
1.7 INJECTION, SOLUTION SUBCUTANEOUS
Qty: 4 ML | Refills: 4 | Status: SHIPPED | OUTPATIENT
Start: 2023-12-06

## 2023-12-06 NOTE — TELEPHONE ENCOUNTER
Patient requesting medication refill.  Please approve or deny this request.    Rx requested:  Requested Prescriptions     Pending Prescriptions Disp Refills    Semaglutide-Weight Management (WEGOVY) 1.7 MG/0.75ML SOAJ SC injection 4 mL 4     Sig: Inject 1.7 mg into the skin every 7 days         Last Office Visit:   10/3/2023      Next Visit Date:  Future Appointments   Date Time Provider [address] [place] [address]   1/2/2024  1:00 PM Cristo Almaguer MD [facility]

## 2023-12-11 ENCOUNTER — TELEPHONE (OUTPATIENT)
Dept: ENDOCRINOLOGY | Age: 58
End: 2023-12-11

## 2023-12-14 NOTE — TELEPHONE ENCOUNTER
Patient would like someone to contact about her prior authorization. She also stated that her insurance company gave her this number to put through [facility]. Thank you!

## 2024-01-02 ENCOUNTER — OFFICE VISIT (OUTPATIENT)
Dept: ENDOCRINOLOGY | Age: 59
End: 2024-01-02
Payer: COMMERCIAL

## 2024-01-02 VITALS
BODY MASS INDEX: 29.88 KG/M2 | WEIGHT: 175 LBS | DIASTOLIC BLOOD PRESSURE: 86 MMHG | OXYGEN SATURATION: 98 % | SYSTOLIC BLOOD PRESSURE: 123 MMHG | HEART RATE: 96 BPM | HEIGHT: 64 IN

## 2024-01-02 DIAGNOSIS — E66.9 OBESITY (BMI 30-39.9): ICD-10-CM

## 2024-01-02 DIAGNOSIS — R63.5 WEIGHT GAIN: Primary | ICD-10-CM

## 2024-01-02 PROCEDURE — 99213 OFFICE O/P EST LOW 20 MIN: CPT | Performed by: INTERNAL MEDICINE

## 2024-01-02 RX ORDER — SEMAGLUTIDE 1.7 MG/.75ML
1.7 INJECTION, SOLUTION SUBCUTANEOUS
Qty: 4 ML | Refills: 4 | Status: SHIPPED | OUTPATIENT
Start: 2024-01-02

## 2024-01-02 RX ORDER — PHENTERMINE HYDROCHLORIDE 37.5 MG/1
37.5 TABLET ORAL
Qty: 30 TABLET | Refills: 2 | Status: SHIPPED | OUTPATIENT
Start: 2024-01-02 | End: 2024-02-02

## 2024-01-02 RX ORDER — PROCHLORPERAZINE MALEATE 5 MG/1
5 TABLET ORAL EVERY 6 HOURS PRN
Qty: 20 TABLET | Refills: 1 | Status: SHIPPED | OUTPATIENT
Start: 2024-01-02

## 2024-01-02 NOTE — PROGRESS NOTES
1/2/2024    Assessment:       Diagnosis Orders   1. Weight gain        2. Obesity (BMI 30-39.9)              PLAN:       Orders Placed This Encounter   Medications    Semaglutide-Weight Management (WEGOVY) 1.7 MG/0.75ML SOAJ SC injection     Sig: Inject 1.7 mg into the skin every 7 days     Dispense:  4 mL     Refill:  4    phentermine (ADIPEX-P) 37.5 MG tablet     Sig: Take 1 tablet by mouth every morning (before breakfast) for 31 days. Take 1 tablet by mouth every morning (before breakfast). Max Daily Amount: 37.5 mg     Dispense:  30 tablet     Refill:  2     BMI 30.02       Subjective:     Chief Complaint   Patient presents with    Obesity     Vitals:    01/02/24 1310   BP: 123/86   Pulse: 96   SpO2: 98%   Weight: 79.4 kg (175 lb)   Height: 1.626 m (5' 4.02\")     Wt Readings from Last 3 Encounters:   01/02/24 79.4 kg (175 lb)   10/03/23 87.5 kg (193 lb)   04/14/23 87.1 kg (192 lb)     BP Readings from Last 3 Encounters:   01/02/24 123/86   10/03/23 111/74   04/14/23 (!) 151/89     Follow-up on obesity weight gain patient on phentermine and wegovy  will be has lost 18 pounds over 3 months wants to continue [place] report was reviewed    Weight Management  This is a chronic problem. The current episode started more than 1 year ago. The problem has been gradually improving. The symptoms are aggravated by eating and stress. Treatments tried: Phentermine/wegovy. The treatment provided moderate relief.     Past Medical History:   Diagnosis Date    Depression     Other chronic cystitis with hematuria     Renal stones      Past Surgical History:   Procedure Laterality Date    BREAST ENHANCEMENT SURGERY  2007    COLONOSCOPY  2000?    (-)     Social History     Socioeconomic History    Marital status:      Spouse name: Not on file    Number of children: Not on file    Years of education: Not on file    Highest education level: Not on file   Occupational History    Not on file   Tobacco Use    Smoking status: Never

## 2024-01-02 NOTE — TELEPHONE ENCOUNTER
Requested Prescriptions     Pending Prescriptions Disp Refills    prochlorperazine (COMPAZINE) 5 MG tablet 20 tablet 1     Sig: Take 1 tablet by mouth every 6 hours as needed for Nausea

## 2024-01-04 DIAGNOSIS — E66.01 CLASS 2 SEVERE OBESITY DUE TO EXCESS CALORIES WITH SERIOUS COMORBIDITY AND BODY MASS INDEX (BMI) OF 38.0 TO 38.9 IN ADULT (MULTI): ICD-10-CM

## 2024-01-04 RX ORDER — BUPROPION HYDROCHLORIDE 200 MG/1
200 TABLET, EXTENDED RELEASE ORAL 2 TIMES DAILY
Qty: 60 TABLET | Refills: 3 | Status: SHIPPED | OUTPATIENT
Start: 2024-01-04

## 2024-01-12 ENCOUNTER — TELEPHONE (OUTPATIENT)
Dept: ENDOCRINOLOGY | Age: 59
End: 2024-01-12

## 2024-01-18 NOTE — TELEPHONE ENCOUNTER
Spoke to patient insurance company and moy [employer] over the phone , there faxing a form to fill out and faxed the last two office notes back to them with the form. Case # [id number]

## 2024-01-18 NOTE — TELEPHONE ENCOUNTER
Spoke to patient I received a denied for wegovy, she called her insurance , there going to send a form to be filled out and faxed the last two office notes showing her weight loss

## 2024-01-19 NOTE — TELEPHONE ENCOUNTER
Received a form PA was not needed, spoke to patient pharmacy and it was approved it went through. But they do not have any she need to to to the [facility]  and there getting it ready for her. Left a message for patient.

## 2024-02-05 RX ORDER — PROCHLORPERAZINE MALEATE 5 MG/1
5 TABLET ORAL EVERY 6 HOURS PRN
Qty: 20 TABLET | Refills: 1 | Status: SHIPPED | OUTPATIENT
Start: 2024-02-05

## 2024-02-12 DIAGNOSIS — F33.41 RECURRENT MAJOR DEPRESSIVE DISORDER, IN PARTIAL REMISSION (CMS-HCC): ICD-10-CM

## 2024-02-12 RX ORDER — DULOXETIN HYDROCHLORIDE 60 MG/1
60 CAPSULE, DELAYED RELEASE ORAL DAILY
Qty: 90 CAPSULE | Refills: 0 | Status: SHIPPED | OUTPATIENT
Start: 2024-02-12

## 2024-03-06 ENCOUNTER — TELEPHONE (OUTPATIENT)
Dept: ENDOCRINOLOGY | Age: 59
End: 2024-03-06

## 2024-03-06 DIAGNOSIS — E66.9 OBESITY (BMI 30-39.9): Primary | ICD-10-CM

## 2024-03-06 RX ORDER — PHENTERMINE HYDROCHLORIDE 37.5 MG/1
37.5 TABLET ORAL
Qty: 30 TABLET | Refills: 2 | Status: SHIPPED | OUTPATIENT
Start: 2024-03-06 | End: 2024-04-05

## 2024-03-06 NOTE — TELEPHONE ENCOUNTER
Patient called, wants to know if a new RX can be done for Adipex generic because the GENE is going to be around $70. She was last seen 1/2/24 and next appt is 7/2/2024.  She uses [facility] in [place].

## 2024-03-21 DIAGNOSIS — I27.0 PRIMARY PULMONARY HTN (MULTI): ICD-10-CM

## 2024-03-22 RX ORDER — PROCHLORPERAZINE MALEATE 5 MG/1
5 TABLET ORAL EVERY 6 HOURS PRN
Qty: 20 TABLET | Refills: 1 | Status: SHIPPED | OUTPATIENT
Start: 2024-03-22

## 2024-03-27 RX ORDER — LISINOPRIL AND HYDROCHLOROTHIAZIDE 10; 12.5 MG/1; MG/1
1 TABLET ORAL DAILY
Qty: 90 TABLET | Refills: 1 | Status: SHIPPED | OUTPATIENT
Start: 2024-03-27

## 2024-04-18 DIAGNOSIS — R63.5 WEIGHT GAIN: ICD-10-CM

## 2024-04-18 RX ORDER — SEMAGLUTIDE 1.7 MG/.75ML
1.7 INJECTION, SOLUTION SUBCUTANEOUS
Qty: 4 ML | Refills: 4 | Status: SHIPPED | OUTPATIENT
Start: 2024-04-18

## 2024-04-18 NOTE — TELEPHONE ENCOUNTER
Patient requesting medication refill. Please approve or deny this request.    Rx requested:  Requested Prescriptions     Pending Prescriptions Disp Refills    Semaglutide-Weight Management (WEGOVY) 1.7 MG/0.75ML SOAJ SC injection 4 mL 4     Sig: Inject 1.7 mg into the skin every 7 days         Last Office Visit:   1/2/2024      Next Visit Date:  Future Appointments   Date Time Provider Department Center   7/2/2024  1:00 PM Rick Vallejo MD [facility]

## 2024-04-20 DIAGNOSIS — E06.3 HYPOTHYROIDISM DUE TO HASHIMOTO'S THYROIDITIS: ICD-10-CM

## 2024-04-20 DIAGNOSIS — E03.8 HYPOTHYROIDISM DUE TO HASHIMOTO'S THYROIDITIS: ICD-10-CM

## 2024-04-23 RX ORDER — LEVOTHYROXINE SODIUM 50 UG/1
50 TABLET ORAL DAILY
Qty: 30 TABLET | Refills: 0 | Status: SHIPPED | OUTPATIENT
Start: 2024-04-23

## 2024-05-03 NOTE — TELEPHONE ENCOUNTER
Patient requesting medication refill. Please approve or deny this request.    Rx requested:  Requested Prescriptions     Pending Prescriptions Disp Refills    prochlorperazine (COMPAZINE) 5 MG tablet 20 tablet 2     Sig: Take 1 tablet by mouth every 6 hours as needed for Nausea         Last Office Visit:   1/2/2024      Next Visit Date:  Future Appointments   Date Time Provider Department Center   7/2/2024  1:00 PM Rick Vallejo MD [facility]

## 2024-05-06 ENCOUNTER — APPOINTMENT (OUTPATIENT)
Dept: PRIMARY CARE | Facility: CLINIC | Age: 59
End: 2024-05-06
Payer: COMMERCIAL

## 2024-05-06 RX ORDER — PROCHLORPERAZINE MALEATE 5 MG/1
5 TABLET ORAL EVERY 6 HOURS PRN
Qty: 20 TABLET | Refills: 2 | Status: SHIPPED | OUTPATIENT
Start: 2024-05-06

## 2024-06-17 DIAGNOSIS — F33.41 RECURRENT MAJOR DEPRESSIVE DISORDER, IN PARTIAL REMISSION (CMS-HCC): ICD-10-CM

## 2024-06-17 RX ORDER — DULOXETIN HYDROCHLORIDE 60 MG/1
60 CAPSULE, DELAYED RELEASE ORAL DAILY
Qty: 30 CAPSULE | Refills: 0 | Status: SHIPPED | OUTPATIENT
Start: 2024-06-17

## 2024-06-17 NOTE — TELEPHONE ENCOUNTER
Patient phones the office today to schedule her appointment w/ Brooke for July.     Patient will be in need of a short term script for her Duloxetine (Cymbalta) 60mg 1 Capsule every day to be sent to [facility] in [place] on [facility].     Thank you.

## 2024-06-18 DIAGNOSIS — I27.0 PRIMARY PULMONARY HTN (MULTI): ICD-10-CM

## 2024-06-18 DIAGNOSIS — E03.8 HYPOTHYROIDISM DUE TO HASHIMOTO'S THYROIDITIS: ICD-10-CM

## 2024-06-18 DIAGNOSIS — E06.3 HYPOTHYROIDISM DUE TO HASHIMOTO'S THYROIDITIS: ICD-10-CM

## 2024-06-18 RX ORDER — LEVOTHYROXINE SODIUM 50 UG/1
50 TABLET ORAL DAILY
Qty: 30 TABLET | Refills: 0 | Status: SHIPPED | OUTPATIENT
Start: 2024-06-18

## 2024-07-08 ENCOUNTER — OFFICE VISIT (OUTPATIENT)
Dept: PRIMARY CARE | Facility: CLINIC | Age: 59
End: 2024-07-08
Payer: COMMERCIAL

## 2024-07-08 VITALS
WEIGHT: 154 LBS | OXYGEN SATURATION: 99 % | DIASTOLIC BLOOD PRESSURE: 82 MMHG | SYSTOLIC BLOOD PRESSURE: 118 MMHG | HEIGHT: 60 IN | BODY MASS INDEX: 30.23 KG/M2 | RESPIRATION RATE: 18 BRPM | TEMPERATURE: 97.3 F | HEART RATE: 80 BPM

## 2024-07-08 DIAGNOSIS — Z13.220 LIPID SCREENING: Primary | ICD-10-CM

## 2024-07-08 DIAGNOSIS — D50.9 IRON DEFICIENCY ANEMIA, UNSPECIFIED IRON DEFICIENCY ANEMIA TYPE: ICD-10-CM

## 2024-07-08 DIAGNOSIS — R11.0 NAUSEA: ICD-10-CM

## 2024-07-08 DIAGNOSIS — E03.8 HYPOTHYROIDISM DUE TO HASHIMOTO'S THYROIDITIS: ICD-10-CM

## 2024-07-08 DIAGNOSIS — Z00.00 ANNUAL PHYSICAL EXAM: ICD-10-CM

## 2024-07-08 DIAGNOSIS — E66.01 CLASS 2 SEVERE OBESITY DUE TO EXCESS CALORIES WITH SERIOUS COMORBIDITY AND BODY MASS INDEX (BMI) OF 38.0 TO 38.9 IN ADULT (MULTI): ICD-10-CM

## 2024-07-08 DIAGNOSIS — E06.3 HYPOTHYROIDISM DUE TO HASHIMOTO'S THYROIDITIS: ICD-10-CM

## 2024-07-08 DIAGNOSIS — I10 PRIMARY HYPERTENSION: ICD-10-CM

## 2024-07-08 DIAGNOSIS — F33.41 RECURRENT MAJOR DEPRESSIVE DISORDER, IN PARTIAL REMISSION (CMS-HCC): ICD-10-CM

## 2024-07-08 DIAGNOSIS — E66.09 CLASS 1 OBESITY DUE TO EXCESS CALORIES WITH SERIOUS COMORBIDITY AND BODY MASS INDEX (BMI) OF 30.0 TO 30.9 IN ADULT: ICD-10-CM

## 2024-07-08 PROBLEM — R31.9 HEMATURIA: Status: RESOLVED | Noted: 2023-03-01 | Resolved: 2024-07-08

## 2024-07-08 PROBLEM — R22.0 SUBCUTANEOUS MASS OF HEAD: Status: RESOLVED | Noted: 2023-03-01 | Resolved: 2024-07-08

## 2024-07-08 PROBLEM — R39.9 UTI SYMPTOMS: Status: RESOLVED | Noted: 2023-03-01 | Resolved: 2024-07-08

## 2024-07-08 PROBLEM — R53.83 FATIGUE: Status: RESOLVED | Noted: 2023-03-01 | Resolved: 2024-07-08

## 2024-07-08 PROCEDURE — 3074F SYST BP LT 130 MM HG: CPT | Performed by: PHYSICIAN ASSISTANT

## 2024-07-08 PROCEDURE — 3079F DIAST BP 80-89 MM HG: CPT | Performed by: PHYSICIAN ASSISTANT

## 2024-07-08 PROCEDURE — 99396 PREV VISIT EST AGE 40-64: CPT | Performed by: PHYSICIAN ASSISTANT

## 2024-07-08 PROCEDURE — 3008F BODY MASS INDEX DOCD: CPT | Performed by: PHYSICIAN ASSISTANT

## 2024-07-08 RX ORDER — ONDANSETRON 8 MG/1
8 TABLET, ORALLY DISINTEGRATING ORAL EVERY 12 HOURS PRN
Qty: 60 TABLET | Refills: 3 | Status: SHIPPED | OUTPATIENT
Start: 2024-07-08

## 2024-07-08 RX ORDER — ONDANSETRON 8 MG/1
8 TABLET, ORALLY DISINTEGRATING ORAL EVERY 8 HOURS PRN
Qty: 30 TABLET | Refills: 3 | Status: SHIPPED | OUTPATIENT
Start: 2024-07-08 | End: 2024-07-08 | Stop reason: SDUPTHER

## 2024-07-08 RX ORDER — SEMAGLUTIDE 1.7 MG/.75ML
1.7 INJECTION, SOLUTION SUBCUTANEOUS
COMMUNITY
Start: 2024-03-08

## 2024-07-08 RX ORDER — LISINOPRIL AND HYDROCHLOROTHIAZIDE 10; 12.5 MG/1; MG/1
1 TABLET ORAL DAILY
Qty: 90 TABLET | Refills: 1 | Status: SHIPPED | OUTPATIENT
Start: 2024-07-08

## 2024-07-08 RX ORDER — BUPROPION HYDROCHLORIDE 200 MG/1
200 TABLET, EXTENDED RELEASE ORAL 2 TIMES DAILY
Qty: 180 TABLET | Refills: 1 | Status: SHIPPED | OUTPATIENT
Start: 2024-07-08

## 2024-07-08 RX ORDER — LEVOTHYROXINE SODIUM 50 UG/1
50 TABLET ORAL DAILY
Qty: 90 TABLET | Refills: 1 | Status: SHIPPED | OUTPATIENT
Start: 2024-07-08

## 2024-07-08 RX ORDER — DULOXETIN HYDROCHLORIDE 60 MG/1
60 CAPSULE, DELAYED RELEASE ORAL DAILY
Qty: 90 CAPSULE | Refills: 1 | Status: SHIPPED | OUTPATIENT
Start: 2024-07-08

## 2024-07-08 ASSESSMENT — ENCOUNTER SYMPTOMS
COUGH: 0
PALPITATIONS: 0
SHORTNESS OF BREATH: 0
FATIGUE: 1
CHEST TIGHTNESS: 0

## 2024-07-08 NOTE — ASSESSMENT & PLAN NOTE
- States she has been doing well overall  - Currently on Cymbalta 60 mg daily and Wellbutrin 200 mg BID  - She would like to taper off of her medications soon, but would like to leave the doses as is for now during her son's transition to college.

## 2024-07-08 NOTE — PROGRESS NOTES
Subjective   Patient ID: Suzette Carey is a 58 y.o. female who presents for Annual Exam (Pt here today for a check up and lab orders; last seen 3/2023 and needing refills. Pt is seeing Dr Stanley and is on Wegovy since Nov and has lost 41 pounds. ).    HPI     Preventive:   - Lives at home in -- with --   - Employment -   - Labs:   - PSA:  - Colon CA:  - Mamm:  - PAP:   - Shingrix:  - Td:  - Lung CA screen (Smoker):   - Diet:   - Exercise:   - Sexual hx:   - Tobacco:   - Illicit drugs:   - Alcohol:   - Mood:   - Dentist visits:  - Eye exams:     Obesity  -        Review of Systems    Objective   BP (!) 122/96   Pulse 80   Temp 36.3 °C (97.3 °F)   Resp 18   Ht 1.524 m (5')   Wt 69.9 kg (154 lb)   SpO2 99%   BMI 30.08 kg/m²     Physical Exam    Assessment/Plan     Problem List Items Addressed This Visit       Depression    Obese     Other Visit Diagnoses       Lipid screening    -  Primary    Hypothyroidism due to Hashimoto's thyroiditis        Primary hypertension        Nausea

## 2024-07-08 NOTE — PROGRESS NOTES
Subjective   Patient ID: Suzette Carey is a 58 y.o. female who presents for Annual Exam (Pt here today for a check up and lab orders; last seen 3/2023 and needing refills. Pt is seeing Dr Stanley and is on Wegovy since Nov and has lost 41 pounds. ).    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 DANIELSON next week). Drinking water (Sprite sometimes). She is watching her calories and reports a balanced diet of protein, vegetables and fruits.  - Exercise: Bought weights but hasn't started workout routine. States she has been swimming more.   - Sexual hx: Sometimes  - Tobacco: No  - Illicit drugs: No  - Alcohol: No  - Mood: Has a moderate amount of anxiety in the past - she is currently experiencing a lot of anxiety with her son moving to college.   - Dentist visits: Every year scheduled  - Eye exams: No eye visits  - General: Feeling tired throughout the day    Obesity  - Lost 41 lbs from last reading in 2022  - Watching diet and caloric intake  - Currently on Wegovy SQ once weekly       Review of Systems   Constitutional:  Positive for fatigue.   Respiratory:  Negative for cough, chest tightness and shortness of breath.    Cardiovascular:  Negative for chest pain and palpitations.       Objective   /82   Pulse 80   Temp 36.3 °C (97.3 °F)   Resp 18   Ht 1.524 m (5')   Wt 69.9 kg (154 lb)   SpO2 99%   BMI 30.08 kg/m²     Physical Exam  Constitutional:       Appearance: Normal appearance.   HENT:      Head: Atraumatic.   Cardiovascular:      Rate and Rhythm: Normal rate and regular rhythm.      Pulses: Normal  pulses.      Heart sounds: Normal heart sounds.   Pulmonary:      Effort: Pulmonary effort is normal.      Breath sounds: Normal breath sounds. No wheezing or rales.   Musculoskeletal:         General: No swelling or tenderness.   Neurological:      General: No focal deficit present.      Mental Status: She is alert and oriented to person, place, and time.   Psychiatric:         Mood and Affect: Mood normal.         Behavior: Behavior normal.         Thought Content: Thought content normal.         Judgment: Judgment normal.         Assessment/Plan     Problem List Items Addressed This Visit       Depression    Current Assessment & Plan     - States she has been doing well overall  - Currently on Cymbalta 60 mg daily and Wellbutrin 200 mg BID  - She would like to taper off of her medications soon, but would like to leave the doses as is for now during her son's transition to college.         Relevant Medications    DULoxetine (Cymbalta) 60 mg DR capsule    RESOLVED: Obese    Current Assessment & Plan     - Currently continuing Wegovy injector once per week  - Refilled Zofran and encouraged to take BID if needed for Wegovy nausea SE  - Encouraged she continue watching her diet and ensuring appropriate intake of protein with her meals. Recommended she continue to pursue exercising throughout the week to assist in her weight loss.         Relevant Medications    buPROPion SR (Wellbutrin SR) 200 mg 12 hr tablet    Class 1 obesity due to excess calories with serious comorbidity and body mass index (BMI) of 30.0 to 30.9 in adult    Overview     - Following with endocrinologist Dr. 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 Increase healthy alternatives, whole grains, fruits vegetables.  - Encouraged to take daily multivitamin.    - Discussed EXERCISE -   Recommended weight training for bone health and 30 minutes of cardiovascular exercise 5-7 days a week.  - Encouraged pt to get yearly eye and dental exams           Other Visit Diagnoses       Lipid screening    -  Primary    Relevant Orders    Lipid Panel    Hypothyroidism due to Hashimoto's thyroiditis        Relevant Medications    levothyroxine (Synthroid, Levoxyl) 50 mcg tablet    Other Relevant Orders    TSH with reflex to Free T4 if abnormal    Primary hypertension        Relevant Medications    lisinopriL-hydrochlorothiazide 10-12.5 mg tablet    Other Relevant Orders    Albumin-Creatinine Ratio, Urine Random    CBC    Comprehensive Metabolic Panel    Nausea        Relevant Medications    ondansetron ODT (Zofran-ODT) 8 mg disintegrating tablet            - GUSTABO Rodas-S2    I was present with the PA student who participated in the documentation of this note. I have personally seen and re-examined the patient and performed the medical decision-making components (assessment and plan of care). I have reviewed the PA student documentation and verified the findings in the note as written with additions or exceptions as stated in  the body of this note.    Brooke Huston PA-C

## 2024-07-08 NOTE — ASSESSMENT & PLAN NOTE
- Currently continuing Wegovy injector once per week  - Refilled Zofran and encouraged to take BID if needed for Wegovy nausea SE  - Encouraged she continue watching her diet and ensuring appropriate intake of protein with her meals. Recommended she continue to pursue exercising throughout the week to assist in her weight loss.

## 2024-07-09 ENCOUNTER — APPOINTMENT (OUTPATIENT)
Dept: PRIMARY CARE | Facility: CLINIC | Age: 59
End: 2024-07-09
Payer: COMMERCIAL

## 2024-07-09 PROBLEM — E66.9 OBESE: Status: RESOLVED | Noted: 2023-03-01 | Resolved: 2024-07-09

## 2024-07-09 NOTE — ASSESSMENT & PLAN NOTE
PREVENTIVE CARE SCREENING:  - Labs:  DUE, ordered today   - Cologuard/ Colonoscopy: DUE, declined   Vaccines:   Declined all vaccines   Women's health:  - PAP: UTD   - Mammogram: DUE, declined   Lifestyle Modification:  - Discussed DIET -   limit snacks, processed foods, sugary and greasy foods, fast foods. Increase healthy alternatives, whole grains, fruits vegetables.  - Encouraged to take daily multivitamin.    - Discussed EXERCISE -   Recommended weight training for bone health and 30 minutes of cardiovascular exercise 5-7 days a week.  - Encouraged pt to get yearly eye and dental exams

## 2024-07-18 DIAGNOSIS — E06.3 HYPOTHYROIDISM DUE TO HASHIMOTO'S THYROIDITIS: ICD-10-CM

## 2024-07-18 DIAGNOSIS — F33.41 RECURRENT MAJOR DEPRESSIVE DISORDER, IN PARTIAL REMISSION (CMS-HCC): ICD-10-CM

## 2024-07-18 DIAGNOSIS — E03.8 HYPOTHYROIDISM DUE TO HASHIMOTO'S THYROIDITIS: ICD-10-CM

## 2024-07-18 RX ORDER — DULOXETIN HYDROCHLORIDE 60 MG/1
60 CAPSULE, DELAYED RELEASE ORAL DAILY
Qty: 30 CAPSULE | Refills: 1 | Status: SHIPPED | OUTPATIENT
Start: 2024-07-18

## 2024-07-18 RX ORDER — LEVOTHYROXINE SODIUM 50 UG/1
TABLET ORAL
Qty: 30 TABLET | Refills: 1 | Status: SHIPPED | OUTPATIENT
Start: 2024-07-18

## 2024-07-31 ENCOUNTER — TELEPHONE (OUTPATIENT)
Dept: ENDOCRINOLOGY | Age: 59
End: 2024-07-31

## 2024-08-06 ENCOUNTER — OFFICE VISIT (OUTPATIENT)
Dept: ENDOCRINOLOGY | Age: 59
End: 2024-08-06

## 2024-08-06 VITALS
SYSTOLIC BLOOD PRESSURE: 117 MMHG | OXYGEN SATURATION: 97 % | WEIGHT: 155 LBS | DIASTOLIC BLOOD PRESSURE: 73 MMHG | BODY MASS INDEX: 26.46 KG/M2 | HEIGHT: 64 IN | HEART RATE: 94 BPM

## 2024-08-06 DIAGNOSIS — E66.9 OBESITY (BMI 30-39.9): ICD-10-CM

## 2024-08-06 DIAGNOSIS — R63.5 WEIGHT GAIN: Primary | ICD-10-CM

## 2024-08-06 RX ORDER — ONDANSETRON 4 MG/1
4 TABLET, ORALLY DISINTEGRATING ORAL 3 TIMES DAILY PRN
Qty: 21 TABLET | Refills: 0 | Status: SHIPPED | OUTPATIENT
Start: 2024-08-06

## 2024-08-06 RX ORDER — PHENTERMINE HYDROCHLORIDE 37.5 MG/1
37.5 TABLET ORAL
COMMUNITY
Start: 2024-05-31

## 2024-08-06 RX ORDER — PHENTERMINE HYDROCHLORIDE 37.5 MG/1
37.5 TABLET ORAL
Qty: 30 TABLET | Refills: 2 | Status: SHIPPED | OUTPATIENT
Start: 2024-08-06 | End: 2024-09-05

## 2024-08-06 RX ORDER — SEMAGLUTIDE 1.7 MG/.75ML
1.7 INJECTION, SOLUTION SUBCUTANEOUS
Qty: 4 ML | Refills: 5 | Status: SHIPPED | OUTPATIENT
Start: 2024-08-06

## 2024-08-06 NOTE — PROGRESS NOTES
on file    Number of children: Not on file    Years of education: Not on file    Highest education level: Not on file   Occupational History    Not on file   Tobacco Use    Smoking status: Never    Smokeless tobacco: Never   Substance and Sexual Activity    Alcohol use: Not on file    Drug use: Not on file    Sexual activity: Not on file   Other Topics Concern    Not on file   Social History Narrative    Not on file     Social Determinants of Health     Financial Resource Strain: Not on file   Food Insecurity: Not on file   Transportation Needs: Not on file   Physical Activity: Not on file   Stress: Not on file   Social Connections: Not on file   Intimate Partner Violence: Not on file   Housing Stability: Not on file     Family History   Problem Relation Age of Onset    Cancer Father      Allergies   Allergen Reactions    Tylenol With Codeine #3 [Acetaminophen-Codeine]     Codeine Nausea And Vomiting       Current Outpatient Medications:     phentermine (ADIPEX-P) 37.5 MG tablet, Take 1 tablet by mouth every morning (before breakfast). Max Daily Amount: 37.5 mg, Disp: , Rfl:     prochlorperazine (COMPAZINE) 5 MG tablet, Take 1 tablet by mouth every 6 hours as needed for Nausea, Disp: 20 tablet, Rfl: 2    Semaglutide-Weight Management (WEGOVY) 1.7 MG/0.75ML SOAJ SC injection, Inject 1.7 mg into the skin every 7 days, Disp: 4 mL, Rfl: 4    ondansetron (ZOFRAN-ODT) 4 MG disintegrating tablet, Take 1 tablet by mouth 3 times daily as needed for Nausea or Vomiting, Disp: 21 tablet, Rfl: 0    phenazopyridine (PYRIDIUM) 200 MG tablet, TAKE 1 TABLET BY MOUTH THREE TIMES DAILY AFTER MEALS AS NEEDED FOR DYSURIA FOR 3 DAYS, Disp: , Rfl:     buPROPion (WELLBUTRIN SR) 200 MG extended release tablet, , Disp: , Rfl:     DULoxetine (CYMBALTA) 60 MG extended release capsule, Take by mouth, Disp: , Rfl:     lisinopril-hydrochlorothiazide (PRINZIDE;ZESTORETIC) 10-12.5 MG per tablet, TAKE 1 TABLET BY MOUTH DAILY, Disp: 30 tablet, Rfl:

## 2024-08-13 ENCOUNTER — TELEPHONE (OUTPATIENT)
Dept: ENDOCRINOLOGY | Age: 59
End: 2024-08-13

## 2024-08-13 NOTE — TELEPHONE ENCOUNTER
Patient called again to check status of PA for Wegovy she states that they advised at [employer] that they were going to fax what was needed to the auth.

## 2024-08-13 NOTE — TELEPHONE ENCOUNTER
Pt calling she states her PA for her wegovy was done wrong twice.  Per her insurance the PA for was filled out wrong and not completed twice.  They are faxing it again and she is asking that it be filled completely out

## 2024-08-16 ENCOUNTER — TELEPHONE (OUTPATIENT)
Dept: ENDOCRINOLOGY | Age: 59
End: 2024-08-16

## 2024-08-16 NOTE — TELEPHONE ENCOUNTER
Roxanne ([employer]) called and stated she is unable to see the appeal and the office needs to call Provider Services @[phone] (any representative can help)

## 2024-09-27 DIAGNOSIS — E03.8 HYPOTHYROIDISM DUE TO HASHIMOTO'S THYROIDITIS: ICD-10-CM

## 2024-09-27 DIAGNOSIS — E06.3 HYPOTHYROIDISM DUE TO HASHIMOTO'S THYROIDITIS: ICD-10-CM

## 2024-09-27 RX ORDER — LEVOTHYROXINE SODIUM 50 UG/1
TABLET ORAL
Qty: 30 TABLET | Refills: 1 | Status: SHIPPED | OUTPATIENT
Start: 2024-09-27

## 2025-03-24 DIAGNOSIS — E06.3 HYPOTHYROIDISM DUE TO HASHIMOTO'S THYROIDITIS: ICD-10-CM

## 2025-03-24 RX ORDER — LEVOTHYROXINE SODIUM 50 UG/1
TABLET ORAL
Qty: 30 TABLET | Refills: 1 | Status: SHIPPED | OUTPATIENT
Start: 2025-03-24

## 2025-04-01 ENCOUNTER — OFFICE VISIT (OUTPATIENT)
Age: 60
End: 2025-04-01
Payer: COMMERCIAL

## 2025-04-01 VITALS
BODY MASS INDEX: 24.59 KG/M2 | SYSTOLIC BLOOD PRESSURE: 100 MMHG | OXYGEN SATURATION: 98 % | WEIGHT: 144 LBS | DIASTOLIC BLOOD PRESSURE: 61 MMHG | HEART RATE: 81 BPM | HEIGHT: 64 IN

## 2025-04-01 DIAGNOSIS — R63.5 WEIGHT GAIN: Primary | ICD-10-CM

## 2025-04-01 DIAGNOSIS — E66.9 OBESITY (BMI 30-39.9): ICD-10-CM

## 2025-04-01 DIAGNOSIS — E53.9 VITAMIN B DEFICIENCY: ICD-10-CM

## 2025-04-01 PROCEDURE — 96372 THER/PROPH/DIAG INJ SC/IM: CPT | Performed by: INTERNAL MEDICINE

## 2025-04-01 PROCEDURE — 99213 OFFICE O/P EST LOW 20 MIN: CPT | Performed by: INTERNAL MEDICINE

## 2025-04-01 RX ORDER — CYANOCOBALAMIN 1000 UG/ML
1000 INJECTION, SOLUTION INTRAMUSCULAR; SUBCUTANEOUS ONCE
Status: COMPLETED | OUTPATIENT
Start: 2025-04-01 | End: 2025-04-01

## 2025-04-01 RX ORDER — SEMAGLUTIDE 1.7 MG/.75ML
1.7 INJECTION, SOLUTION SUBCUTANEOUS
Qty: 4 ML | Refills: 5 | Status: SHIPPED | OUTPATIENT
Start: 2025-04-01

## 2025-04-01 RX ADMIN — CYANOCOBALAMIN 1000 MCG: 1000 INJECTION, SOLUTION INTRAMUSCULAR; SUBCUTANEOUS at 15:17

## 2025-04-01 NOTE — PROGRESS NOTES
4/1/2025    Assessment:       Diagnosis Orders   1. Weight gain        2. Obesity (BMI 30-39.9)              PLAN:           No orders of the defined types were placed in this encounter.    No orders of the defined types were placed in this encounter.    No follow-ups on file.  Subjective:     Chief Complaint   Patient presents with    Obesity     Lost 11lbs     Weight Loss     Vitals:    04/01/25 1453   BP: 100/61   Pulse: 81   SpO2: 98%   Weight: 65.3 kg (144 lb)   Height: 1.626 m (5' 4\")     Wt Readings from Last 3 Encounters:   04/01/25 65.3 kg (144 lb)   08/06/24 70.3 kg (155 lb)   01/02/24 79.4 kg (175 lb)     BP Readings from Last 3 Encounters:   04/01/25 100/61   08/06/24 117/73   01/02/24 123/86     HPI  Past Medical History:   Diagnosis Date    Depression     Other chronic cystitis with hematuria     Renal stones      Past Surgical History:   Procedure Laterality Date    BREAST ENHANCEMENT SURGERY  2007    COLONOSCOPY  2000?    (-)     Social History     Socioeconomic History    Marital status:      Spouse name: Not on file    Number of children: Not on file    Years of education: Not on file    Highest education level: Not on file   Occupational History    Not on file   Tobacco Use    Smoking status: Never     Passive exposure: Never    Smokeless tobacco: Never   Vaping Use    Vaping status: Never Used   Substance and Sexual Activity    Alcohol use: Not on file    Drug use: Not on file    Sexual activity: Not on file   Other Topics Concern    Not on file   Social History Narrative    Not on file     Social Drivers of Health     Financial Resource Strain: Not on file   Food Insecurity: Not on file   Transportation Needs: Not on file   Physical Activity: Not on file   Stress: Not on file   Social Connections: Not on file   Intimate Partner Violence: Not on file   Housing Stability: Not on file     Family History   Problem Relation Age of Onset    Cancer Father      Allergies   Allergen Reactions

## 2025-04-20 DIAGNOSIS — R11.0 NAUSEA: ICD-10-CM

## 2025-04-21 RX ORDER — ONDANSETRON 8 MG/1
TABLET, ORALLY DISINTEGRATING ORAL
Qty: 60 TABLET | Refills: 3 | Status: SHIPPED | OUTPATIENT
Start: 2025-04-21

## 2025-04-30 DIAGNOSIS — F33.41 RECURRENT MAJOR DEPRESSIVE DISORDER, IN PARTIAL REMISSION (CMS-HCC): ICD-10-CM

## 2025-04-30 RX ORDER — DULOXETIN HYDROCHLORIDE 60 MG/1
60 CAPSULE, DELAYED RELEASE ORAL DAILY
Qty: 90 CAPSULE | Refills: 3 | Status: SHIPPED | OUTPATIENT
Start: 2025-04-30

## 2025-05-16 DIAGNOSIS — I10 PRIMARY HYPERTENSION: ICD-10-CM

## 2025-05-16 RX ORDER — LISINOPRIL AND HYDROCHLOROTHIAZIDE 10; 12.5 MG/1; MG/1
1 TABLET ORAL DAILY
Qty: 90 TABLET | Refills: 0 | Status: SHIPPED | OUTPATIENT
Start: 2025-05-16

## 2025-05-17 DIAGNOSIS — E06.3 HYPOTHYROIDISM DUE TO HASHIMOTO'S THYROIDITIS: ICD-10-CM

## 2025-05-29 RX ORDER — LEVOTHYROXINE SODIUM 50 UG/1
TABLET ORAL
Qty: 30 TABLET | Refills: 1 | Status: SHIPPED | OUTPATIENT
Start: 2025-05-29

## 2025-06-13 DIAGNOSIS — E06.3 HYPOTHYROIDISM DUE TO HASHIMOTO'S THYROIDITIS: ICD-10-CM

## 2025-06-13 RX ORDER — LEVOTHYROXINE SODIUM 50 UG/1
TABLET ORAL
Qty: 30 TABLET | Refills: 1 | Status: SHIPPED | OUTPATIENT
Start: 2025-06-13

## 2025-07-21 DIAGNOSIS — E66.01 CLASS 2 SEVERE OBESITY DUE TO EXCESS CALORIES WITH SERIOUS COMORBIDITY AND BODY MASS INDEX (BMI) OF 38.0 TO 38.9 IN ADULT: ICD-10-CM

## 2025-07-21 DIAGNOSIS — E66.812 CLASS 2 SEVERE OBESITY DUE TO EXCESS CALORIES WITH SERIOUS COMORBIDITY AND BODY MASS INDEX (BMI) OF 38.0 TO 38.9 IN ADULT: ICD-10-CM

## 2025-07-22 RX ORDER — BUPROPION HYDROCHLORIDE 200 MG/1
200 TABLET, EXTENDED RELEASE ORAL 2 TIMES DAILY
Qty: 180 TABLET | Refills: 1 | Status: SHIPPED | OUTPATIENT
Start: 2025-07-22

## 2025-07-23 ENCOUNTER — APPOINTMENT (OUTPATIENT)
Dept: PRIMARY CARE | Facility: CLINIC | Age: 60
End: 2025-07-23
Payer: COMMERCIAL